# Patient Record
Sex: FEMALE | Race: WHITE | NOT HISPANIC OR LATINO
[De-identification: names, ages, dates, MRNs, and addresses within clinical notes are randomized per-mention and may not be internally consistent; named-entity substitution may affect disease eponyms.]

---

## 2017-07-29 ENCOUNTER — TRANSCRIPTION ENCOUNTER (OUTPATIENT)
Age: 27
End: 2017-07-29

## 2018-08-05 ENCOUNTER — TRANSCRIPTION ENCOUNTER (OUTPATIENT)
Age: 28
End: 2018-08-05

## 2018-12-30 ENCOUNTER — TRANSCRIPTION ENCOUNTER (OUTPATIENT)
Age: 28
End: 2018-12-30

## 2021-01-27 PROBLEM — Z00.00 ENCOUNTER FOR PREVENTIVE HEALTH EXAMINATION: Status: ACTIVE | Noted: 2021-01-27

## 2021-02-04 ENCOUNTER — ASOB RESULT (OUTPATIENT)
Age: 31
End: 2021-02-04

## 2021-02-04 ENCOUNTER — APPOINTMENT (OUTPATIENT)
Dept: ANTEPARTUM | Facility: CLINIC | Age: 31
End: 2021-02-04
Payer: COMMERCIAL

## 2021-02-04 PROCEDURE — 99072 ADDL SUPL MATRL&STAF TM PHE: CPT

## 2021-02-04 PROCEDURE — 76811 OB US DETAILED SNGL FETUS: CPT

## 2021-02-25 ENCOUNTER — ASOB RESULT (OUTPATIENT)
Age: 31
End: 2021-02-25

## 2021-02-25 ENCOUNTER — APPOINTMENT (OUTPATIENT)
Dept: ANTEPARTUM | Facility: CLINIC | Age: 31
End: 2021-02-25
Payer: COMMERCIAL

## 2021-02-25 PROCEDURE — 99072 ADDL SUPL MATRL&STAF TM PHE: CPT

## 2021-02-25 PROCEDURE — 76816 OB US FOLLOW-UP PER FETUS: CPT

## 2021-06-02 ENCOUNTER — TRANSCRIPTION ENCOUNTER (OUTPATIENT)
Age: 31
End: 2021-06-02

## 2021-06-02 ENCOUNTER — INPATIENT (INPATIENT)
Facility: HOSPITAL | Age: 31
LOS: 3 days | Discharge: HOME | End: 2021-06-06
Attending: OBSTETRICS & GYNECOLOGY | Admitting: OBSTETRICS & GYNECOLOGY
Payer: COMMERCIAL

## 2021-06-02 VITALS — DIASTOLIC BLOOD PRESSURE: 100 MMHG | HEART RATE: 68 BPM | SYSTOLIC BLOOD PRESSURE: 169 MMHG

## 2021-06-02 DIAGNOSIS — O26.899 OTHER SPECIFIED PREGNANCY RELATED CONDITIONS, UNSPECIFIED TRIMESTER: ICD-10-CM

## 2021-06-02 DIAGNOSIS — Z3A.00 WEEKS OF GESTATION OF PREGNANCY NOT SPECIFIED: ICD-10-CM

## 2021-06-02 DIAGNOSIS — Z34.80 ENCOUNTER FOR SUPERVISION OF OTHER NORMAL PREGNANCY, UNSPECIFIED TRIMESTER: ICD-10-CM

## 2021-06-02 LAB
ALBUMIN SERPL ELPH-MCNC: 3.1 G/DL — LOW (ref 3.3–5)
ALP SERPL-CCNC: 159 U/L — HIGH (ref 40–120)
ALT FLD-CCNC: 13 U/L — SIGNIFICANT CHANGE UP (ref 10–45)
ANION GAP SERPL CALC-SCNC: 14 MMOL/L — SIGNIFICANT CHANGE UP (ref 5–17)
APPEARANCE UR: CLEAR — SIGNIFICANT CHANGE UP
APTT BLD: 23.4 SEC — LOW (ref 27.5–35.5)
AST SERPL-CCNC: 21 U/L — SIGNIFICANT CHANGE UP (ref 10–40)
BACTERIA # UR AUTO: ABNORMAL
BASOPHILS # BLD AUTO: 0.04 K/UL — SIGNIFICANT CHANGE UP (ref 0–0.2)
BASOPHILS NFR BLD AUTO: 0.6 % — SIGNIFICANT CHANGE UP (ref 0–2)
BILIRUB SERPL-MCNC: 0.1 MG/DL — LOW (ref 0.2–1.2)
BILIRUB UR-MCNC: NEGATIVE — SIGNIFICANT CHANGE UP
BLD GP AB SCN SERPL QL: NEGATIVE — SIGNIFICANT CHANGE UP
BUN SERPL-MCNC: 15 MG/DL — SIGNIFICANT CHANGE UP (ref 7–23)
CALCIUM SERPL-MCNC: 8.4 MG/DL — SIGNIFICANT CHANGE UP (ref 8.4–10.5)
CHLORIDE SERPL-SCNC: 104 MMOL/L — SIGNIFICANT CHANGE UP (ref 96–108)
CO2 SERPL-SCNC: 16 MMOL/L — LOW (ref 22–31)
COLOR SPEC: YELLOW — SIGNIFICANT CHANGE UP
CREAT ?TM UR-MCNC: 164 MG/DL — SIGNIFICANT CHANGE UP
CREAT SERPL-MCNC: 0.72 MG/DL — SIGNIFICANT CHANGE UP (ref 0.5–1.3)
DIFF PNL FLD: ABNORMAL
EOSINOPHIL # BLD AUTO: 0.08 K/UL — SIGNIFICANT CHANGE UP (ref 0–0.5)
EOSINOPHIL NFR BLD AUTO: 1.1 % — SIGNIFICANT CHANGE UP (ref 0–6)
EPI CELLS # UR: 6 /HPF — HIGH
FIBRINOGEN PPP-MCNC: 542 MG/DL — HIGH (ref 290–520)
GLUCOSE BLDC GLUCOMTR-MCNC: 70 MG/DL — SIGNIFICANT CHANGE UP (ref 70–99)
GLUCOSE SERPL-MCNC: 87 MG/DL — SIGNIFICANT CHANGE UP (ref 70–99)
GLUCOSE UR QL: NEGATIVE — SIGNIFICANT CHANGE UP
HCT VFR BLD CALC: 29.5 % — LOW (ref 34.5–45)
HGB BLD-MCNC: 9.8 G/DL — LOW (ref 11.5–15.5)
HYALINE CASTS # UR AUTO: 2 /LPF — SIGNIFICANT CHANGE UP (ref 0–2)
IMM GRANULOCYTES NFR BLD AUTO: 0.8 % — SIGNIFICANT CHANGE UP (ref 0–1.5)
INR BLD: 0.9 RATIO — SIGNIFICANT CHANGE UP (ref 0.88–1.16)
KETONES UR-MCNC: NEGATIVE — SIGNIFICANT CHANGE UP
LDH SERPL L TO P-CCNC: 270 U/L — HIGH (ref 50–242)
LEUKOCYTE ESTERASE UR-ACNC: NEGATIVE — SIGNIFICANT CHANGE UP
LYMPHOCYTES # BLD AUTO: 1.58 K/UL — SIGNIFICANT CHANGE UP (ref 1–3.3)
LYMPHOCYTES # BLD AUTO: 22.2 % — SIGNIFICANT CHANGE UP (ref 13–44)
MCHC RBC-ENTMCNC: 30.3 PG — SIGNIFICANT CHANGE UP (ref 27–34)
MCHC RBC-ENTMCNC: 33.2 GM/DL — SIGNIFICANT CHANGE UP (ref 32–36)
MCV RBC AUTO: 91.3 FL — SIGNIFICANT CHANGE UP (ref 80–100)
MONOCYTES # BLD AUTO: 0.58 K/UL — SIGNIFICANT CHANGE UP (ref 0–0.9)
MONOCYTES NFR BLD AUTO: 8.1 % — SIGNIFICANT CHANGE UP (ref 2–14)
NEUTROPHILS # BLD AUTO: 4.79 K/UL — SIGNIFICANT CHANGE UP (ref 1.8–7.4)
NEUTROPHILS NFR BLD AUTO: 67.2 % — SIGNIFICANT CHANGE UP (ref 43–77)
NITRITE UR-MCNC: NEGATIVE — SIGNIFICANT CHANGE UP
NRBC # BLD: 0 /100 WBCS — SIGNIFICANT CHANGE UP (ref 0–0)
PH UR: 7.5 — SIGNIFICANT CHANGE UP (ref 5–8)
PLATELET # BLD AUTO: 219 K/UL — SIGNIFICANT CHANGE UP (ref 150–400)
POTASSIUM SERPL-MCNC: 4.7 MMOL/L — SIGNIFICANT CHANGE UP (ref 3.5–5.3)
POTASSIUM SERPL-SCNC: 4.7 MMOL/L — SIGNIFICANT CHANGE UP (ref 3.5–5.3)
PROT ?TM UR-MCNC: 276 MG/DL — HIGH (ref 0–12)
PROT SERPL-MCNC: 6.2 G/DL — SIGNIFICANT CHANGE UP (ref 6–8.3)
PROT UR-MCNC: ABNORMAL
PROT/CREAT UR-RTO: 1.7 RATIO — HIGH (ref 0–0.2)
PROTHROM AB SERPL-ACNC: 10.8 SEC — SIGNIFICANT CHANGE UP (ref 10.6–13.6)
RBC # BLD: 3.23 M/UL — LOW (ref 3.8–5.2)
RBC # FLD: 12.5 % — SIGNIFICANT CHANGE UP (ref 10.3–14.5)
RBC CASTS # UR COMP ASSIST: 1 /HPF — SIGNIFICANT CHANGE UP (ref 0–4)
RH IG SCN BLD-IMP: NEGATIVE — SIGNIFICANT CHANGE UP
SARS-COV-2 RNA SPEC QL NAA+PROBE: SIGNIFICANT CHANGE UP
SODIUM SERPL-SCNC: 134 MMOL/L — LOW (ref 135–145)
SP GR SPEC: 1.02 — SIGNIFICANT CHANGE UP (ref 1.01–1.02)
URATE SERPL-MCNC: 5.5 MG/DL — SIGNIFICANT CHANGE UP (ref 2.5–7)
UROBILINOGEN FLD QL: NEGATIVE — SIGNIFICANT CHANGE UP
WBC # BLD: 7.13 K/UL — SIGNIFICANT CHANGE UP (ref 3.8–10.5)
WBC # FLD AUTO: 7.13 K/UL — SIGNIFICANT CHANGE UP (ref 3.8–10.5)
WBC UR QL: 13 /HPF — HIGH (ref 0–5)

## 2021-06-02 RX ORDER — CITRIC ACID/SODIUM CITRATE 300-500 MG
15 SOLUTION, ORAL ORAL EVERY 6 HOURS
Refills: 0 | Status: DISCONTINUED | OUTPATIENT
Start: 2021-06-02 | End: 2021-06-03

## 2021-06-02 RX ORDER — MAGNESIUM SULFATE 500 MG/ML
2 VIAL (ML) INJECTION
Qty: 40 | Refills: 0 | Status: DISCONTINUED | OUTPATIENT
Start: 2021-06-02 | End: 2021-06-03

## 2021-06-02 RX ORDER — OXYTOCIN 10 UNIT/ML
333.33 VIAL (ML) INJECTION
Qty: 20 | Refills: 0 | Status: DISCONTINUED | OUTPATIENT
Start: 2021-06-02 | End: 2021-06-06

## 2021-06-02 RX ORDER — NIFEDIPINE 30 MG
30 TABLET, EXTENDED RELEASE 24 HR ORAL DAILY
Refills: 0 | Status: DISCONTINUED | OUTPATIENT
Start: 2021-06-02 | End: 2021-06-06

## 2021-06-02 RX ORDER — MAGNESIUM SULFATE 500 MG/ML
4 VIAL (ML) INJECTION ONCE
Refills: 0 | Status: COMPLETED | OUTPATIENT
Start: 2021-06-02 | End: 2021-06-02

## 2021-06-02 RX ORDER — ACETAMINOPHEN 500 MG
975 TABLET ORAL ONCE
Refills: 0 | Status: COMPLETED | OUTPATIENT
Start: 2021-06-02 | End: 2021-06-02

## 2021-06-02 RX ORDER — SODIUM CHLORIDE 9 MG/ML
1000 INJECTION, SOLUTION INTRAVENOUS
Refills: 0 | Status: DISCONTINUED | OUTPATIENT
Start: 2021-06-02 | End: 2021-06-03

## 2021-06-02 RX ORDER — NIFEDIPINE 30 MG
10 TABLET, EXTENDED RELEASE 24 HR ORAL ONCE
Refills: 0 | Status: COMPLETED | OUTPATIENT
Start: 2021-06-02 | End: 2021-06-02

## 2021-06-02 RX ORDER — SODIUM CHLORIDE 9 MG/ML
1000 INJECTION INTRAMUSCULAR; INTRAVENOUS; SUBCUTANEOUS
Refills: 0 | Status: DISCONTINUED | OUTPATIENT
Start: 2021-06-02 | End: 2021-06-03

## 2021-06-02 RX ADMIN — SODIUM CHLORIDE 125 MILLILITER(S): 9 INJECTION, SOLUTION INTRAVENOUS at 20:27

## 2021-06-02 RX ADMIN — Medication 50 GM/HR: at 20:45

## 2021-06-02 RX ADMIN — Medication 10 MILLIGRAM(S): at 20:05

## 2021-06-02 RX ADMIN — Medication 200 GRAM(S): at 20:19

## 2021-06-02 RX ADMIN — Medication 975 MILLIGRAM(S): at 23:01

## 2021-06-02 NOTE — OB PROVIDER TRIAGE NOTE - NSOBPROVIDERNOTE_OBGYN_ALL_OB_FT
A/P:  31y  @38wks and 2 days gestation presenting from office with elevated BP. In triage, patient's first BP noted to be 169/100 but repeat /96 so did not require an IVP of medication. +FM  -EFM/[place]  -Continue to monitor BP's q15m  -HELLP labs, P:Cr ratio to be sent  Plan to be d/w Dr. Román Monroe PA-C

## 2021-06-02 NOTE — OB PROVIDER TRIAGE NOTE - HISTORY OF PRESENT ILLNESS
31y  @38wks and 2 days gestation presenting with an elevated BP in the office. Patient was seen for routine visit and her BP was 150/100. She states this is the first time her BP has ever been elevated in pregnancy. She states she had a HA this morning that resolved after resting. She admits to seeing intermittent floaters x1wk and states her ankles have been swollen for a few months. She denies epigastric pain, ctx, LOF or VB. PNC also c/b GDMA1. +FM. GBS -. EFW 7#12 done by ultrasound today.    POBHx: Denies  PGYNHx: Denies fibroids, ovarian cysts, abnormal pap smears, STD's  PMhx: Anxiety-no meds  Medications: PNV  Allergies: NKDA, Latex  PSHx: Denies  Social Hx: Denies etoh/tobacco/drug use    Vital Signs Last 24 Hrs  T(C): --  T(F): --  HR: 63 (2021 19:02) (63 - 68)  BP: 159/96 (2021 19:02) (159/96 - 169/100)  BP(mean): --  RR: --  SpO2: --    General: NAD, A&Ox3  CV: RRR  Lungs: CTA b/l  Abdomen: Soft, NT, gravid  Ext: Mild swelling noted in B/L LE, Neg homans sign B/L    VE: Deferred, .5cm in office today  EFM: 130/moderate variability/+accels/no decels  Short: Irregular

## 2021-06-02 NOTE — OB PROVIDER TRIAGE NOTE - PMH
No pertinent past medical history <<----- Click to add NO pertinent Past Medical History negative...

## 2021-06-02 NOTE — OB PROVIDER H&P - NSHPPHYSICALEXAM_GEN_ALL_CORE
Vital Signs Last 24 Hrs  T(C): 36.8 (02 Jun 2021 19:16), Max: 36.8 (02 Jun 2021 19:16)  T(F): 98.2 (02 Jun 2021 19:16), Max: 98.2 (02 Jun 2021 19:16)  HR: 62 (02 Jun 2021 19:50) (62 - 77)  BP: 154/94 (02 Jun 2021 19:50) (154/94 - 169/100)  BP(mean): --  RR: 17 (02 Jun 2021 19:16) (17 - 17)  SpO2: 100% (02 Jun 2021 19:48) (89% - 100%)    General: NAD, A&Ox3  CV: RRR  Lungs: CTA b/l  Abdomen: Soft, NT, gravid  Ext: Mild swelling noted in B/L LE, Neg homans sign B/L

## 2021-06-02 NOTE — OB PROVIDER H&P - HISTORY OF PRESENT ILLNESS
31y  @38wks and 2 days gestation presenting with an elevated BP in the office. Patient was seen for routine visit and her BP was 150/100. She states this is the first time her BP has ever been elevated in pregnancy. She states she had a HA this morning that resolved after resting. She admits to seeing intermittent floaters x1wk and states her ankles have been swollen for a few months. She denies epigastric pain, ctx, LOF or VB. PNC also c/b GDMA1. +FM. GBS -. EFW 7#12 done by ultrasound today.    POBHx: Denies  PGYNHx: Denies fibroids, ovarian cysts, abnormal pap smears, STD's  PMhx: Anxiety-no meds  Medications: PNV  Allergies: NKDA, Latex  PSHx: Denies  Social Hx: Denies etoh/tobacco/drug use    Vital Signs Last 24 Hrs  T(C): 36.8 (2021 19:16), Max: 36.8 (2021 19:16)  T(F): 98.2 (2021 19:16), Max: 98.2 (2021 19:16)  HR: 62 (2021 19:50) (62 - 77)  BP: 154/94 (2021 19:50) (154/94 - 169/100)  BP(mean): --  RR: 17 (2021 19:16) (17 - 17)  SpO2: 100% (2021 19:48) (89% - 100%)    General: NAD, A&Ox3  CV: RRR  Lungs: CTA b/l  Abdomen: Soft, NT, gravid  Ext: Mild swelling noted in B/L LE, Neg homans sign B/L    VE: Deferred, .5cm in office today  EFM: 130/moderate variability/+accels/no decels  Blades: Irregular

## 2021-06-02 NOTE — OB PROVIDER H&P - ASSESSMENT
A/P:  31y  @38wks and 2 days gestation presenting from the office with elevated BP. While in triage, patient had multiple severe range BP's requiring a push of Procardia 10 IR. Patient is for IOL for sPEC and is to be started on Mg. PNC also c/b GDMA1. +FM. GBS -. EFW 3500g  -Admit to L&D  -Routine labs  -F/u HELLP labs and P:Cr ratio  -EFM/[place]  -NPO, IVF, Bicitra  -IOL with PO cytotec. For CB @12am  -FS q4h in latent labor, q2h in active labor  -Mg to be started  -Continue to monitor BP's q15m  -COVID swab  -Anesthesia consult  -Anticipate   D/w Dr. [place] and Dr. Jovanna Monroe PA-C

## 2021-06-02 NOTE — OB PROVIDER TRIAGE NOTE - NSHPPHYSICALEXAM_GEN_ALL_CORE
Vital Signs Last 24 Hrs  T(C): --  T(F): --  HR: 63 (02 Jun 2021 19:02) (63 - 68)  BP: 159/96 (02 Jun 2021 19:02) (159/96 - 169/100)  BP(mean): --  RR: --  SpO2: --    General: NAD, A&Ox3  CV: RRR  Lungs: CTA b/l  Abdomen: Soft, NT, gravid  Ext: Mild swelling noted in B/L LE, Neg homans sign B/L

## 2021-06-02 NOTE — CHART NOTE - NSCHARTNOTEFT_GEN_A_CORE
PA Lab Note:    HELLP labs:  H/H: 9.8/29.5  Plt: 219  Cr: .72  AST/ALT: 21/13  Uric acid: 5.5  LDH: 270  P/Cr ratio: 1.7    A/P:  -Continue current mgmt    Maye Monroe PA-C

## 2021-06-02 NOTE — OB PROVIDER IHI INDUCTION/AUGMENTATION NOTE - NS_OBIHIREPANPSATTEST_OBGYN_ALL_OB
Chief Complaint   Patient presents with     RECHECK     UMP- HEADACHES/ CHIARI MALFORMATION     Patient was at the ER on Tuesday because the Headaches.  
I have discussed with the Attending the patient's status, as well as the H&P and the fetal status. The Attending agreed with the suggested management.

## 2021-06-03 LAB
COVID-19 SPIKE DOMAIN AB INTERP: NEGATIVE — SIGNIFICANT CHANGE UP
COVID-19 SPIKE DOMAIN ANTIBODY RESULT: 0.4 U/ML — SIGNIFICANT CHANGE UP
GLUCOSE BLDC GLUCOMTR-MCNC: 100 MG/DL — HIGH (ref 70–99)
GLUCOSE BLDC GLUCOMTR-MCNC: 101 MG/DL — HIGH (ref 70–99)
GLUCOSE BLDC GLUCOMTR-MCNC: 79 MG/DL — SIGNIFICANT CHANGE UP (ref 70–99)
GLUCOSE BLDC GLUCOMTR-MCNC: 94 MG/DL — SIGNIFICANT CHANGE UP (ref 70–99)
MAGNESIUM SERPL-MCNC: 4.9 MG/DL — HIGH (ref 1.6–2.6)
MAGNESIUM SERPL-MCNC: 6.1 MG/DL — HIGH (ref 1.6–2.6)
MAGNESIUM SERPL-MCNC: 6.9 MG/DL — HIGH (ref 1.6–2.6)
SARS-COV-2 IGG+IGM SERPL QL IA: 0.4 U/ML — SIGNIFICANT CHANGE UP
SARS-COV-2 IGG+IGM SERPL QL IA: NEGATIVE — SIGNIFICANT CHANGE UP
T PALLIDUM AB TITR SER: NEGATIVE — SIGNIFICANT CHANGE UP

## 2021-06-03 RX ORDER — HYDRALAZINE HCL 50 MG
10 TABLET ORAL ONCE
Refills: 0 | Status: DISCONTINUED | OUTPATIENT
Start: 2021-06-03 | End: 2021-06-06

## 2021-06-03 RX ORDER — FAMOTIDINE 10 MG/ML
20 INJECTION INTRAVENOUS ONCE
Refills: 0 | Status: COMPLETED | OUTPATIENT
Start: 2021-06-03 | End: 2021-06-03

## 2021-06-03 RX ORDER — OXYTOCIN 10 UNIT/ML
333.33 VIAL (ML) INJECTION
Qty: 20 | Refills: 0 | Status: DISCONTINUED | OUTPATIENT
Start: 2021-06-04 | End: 2021-06-06

## 2021-06-03 RX ORDER — MORPHINE SULFATE 50 MG/1
2 CAPSULE, EXTENDED RELEASE ORAL ONCE
Refills: 0 | Status: DISCONTINUED | OUTPATIENT
Start: 2021-06-03 | End: 2021-06-04

## 2021-06-03 RX ORDER — ACETAMINOPHEN 500 MG
1000 TABLET ORAL ONCE
Refills: 0 | Status: COMPLETED | OUTPATIENT
Start: 2021-06-03 | End: 2021-06-03

## 2021-06-03 RX ORDER — OXYCODONE HYDROCHLORIDE 5 MG/1
5 TABLET ORAL
Refills: 0 | Status: COMPLETED | OUTPATIENT
Start: 2021-06-03 | End: 2021-06-10

## 2021-06-03 RX ORDER — LANOLIN
1 OINTMENT (GRAM) TOPICAL EVERY 6 HOURS
Refills: 0 | Status: DISCONTINUED | OUTPATIENT
Start: 2021-06-03 | End: 2021-06-06

## 2021-06-03 RX ORDER — MAGNESIUM SULFATE 500 MG/ML
1.5 VIAL (ML) INJECTION
Qty: 40 | Refills: 0 | Status: DISCONTINUED | OUTPATIENT
Start: 2021-06-03 | End: 2021-06-06

## 2021-06-03 RX ORDER — DIPHENHYDRAMINE HCL 50 MG
25 CAPSULE ORAL EVERY 6 HOURS
Refills: 0 | Status: DISCONTINUED | OUTPATIENT
Start: 2021-06-03 | End: 2021-06-06

## 2021-06-03 RX ORDER — ONDANSETRON 8 MG/1
4 TABLET, FILM COATED ORAL EVERY 6 HOURS
Refills: 0 | Status: DISCONTINUED | OUTPATIENT
Start: 2021-06-03 | End: 2021-06-04

## 2021-06-03 RX ORDER — MAGNESIUM HYDROXIDE 400 MG/1
30 TABLET, CHEWABLE ORAL
Refills: 0 | Status: DISCONTINUED | OUTPATIENT
Start: 2021-06-03 | End: 2021-06-06

## 2021-06-03 RX ORDER — OXYCODONE HYDROCHLORIDE 5 MG/1
5 TABLET ORAL ONCE
Refills: 0 | Status: DISCONTINUED | OUTPATIENT
Start: 2021-06-03 | End: 2021-06-06

## 2021-06-03 RX ORDER — LABETALOL HCL 100 MG
20 TABLET ORAL ONCE
Refills: 0 | Status: DISCONTINUED | OUTPATIENT
Start: 2021-06-03 | End: 2021-06-06

## 2021-06-03 RX ORDER — OXYTOCIN 10 UNIT/ML
4 VIAL (ML) INJECTION
Qty: 30 | Refills: 0 | Status: DISCONTINUED | OUTPATIENT
Start: 2021-06-03 | End: 2021-06-06

## 2021-06-03 RX ORDER — KETOROLAC TROMETHAMINE 30 MG/ML
30 SYRINGE (ML) INJECTION EVERY 6 HOURS
Refills: 0 | Status: DISCONTINUED | OUTPATIENT
Start: 2021-06-03 | End: 2021-06-04

## 2021-06-03 RX ORDER — IBUPROFEN 200 MG
600 TABLET ORAL EVERY 6 HOURS
Refills: 0 | Status: COMPLETED | OUTPATIENT
Start: 2021-06-04 | End: 2022-05-03

## 2021-06-03 RX ORDER — SIMETHICONE 80 MG/1
80 TABLET, CHEWABLE ORAL EVERY 4 HOURS
Refills: 0 | Status: DISCONTINUED | OUTPATIENT
Start: 2021-06-04 | End: 2021-06-06

## 2021-06-03 RX ORDER — SODIUM CHLORIDE 9 MG/ML
1000 INJECTION, SOLUTION INTRAVENOUS
Refills: 0 | Status: DISCONTINUED | OUTPATIENT
Start: 2021-06-03 | End: 2021-06-06

## 2021-06-03 RX ORDER — NALOXONE HYDROCHLORIDE 4 MG/.1ML
0.1 SPRAY NASAL
Refills: 0 | Status: DISCONTINUED | OUTPATIENT
Start: 2021-06-03 | End: 2021-06-04

## 2021-06-03 RX ORDER — METOCLOPRAMIDE HCL 10 MG
10 TABLET ORAL ONCE
Refills: 0 | Status: COMPLETED | OUTPATIENT
Start: 2021-06-03 | End: 2021-06-03

## 2021-06-03 RX ORDER — DEXAMETHASONE 0.5 MG/5ML
4 ELIXIR ORAL EVERY 6 HOURS
Refills: 0 | Status: DISCONTINUED | OUTPATIENT
Start: 2021-06-03 | End: 2021-06-04

## 2021-06-03 RX ORDER — HEPARIN SODIUM 5000 [USP'U]/ML
5000 INJECTION INTRAVENOUS; SUBCUTANEOUS EVERY 12 HOURS
Refills: 0 | Status: DISCONTINUED | OUTPATIENT
Start: 2021-06-03 | End: 2021-06-06

## 2021-06-03 RX ORDER — TETANUS TOXOID, REDUCED DIPHTHERIA TOXOID AND ACELLULAR PERTUSSIS VACCINE, ADSORBED 5; 2.5; 8; 8; 2.5 [IU]/.5ML; [IU]/.5ML; UG/.5ML; UG/.5ML; UG/.5ML
0.5 SUSPENSION INTRAMUSCULAR ONCE
Refills: 0 | Status: DISCONTINUED | OUTPATIENT
Start: 2021-06-04 | End: 2021-06-06

## 2021-06-03 RX ORDER — ACETAMINOPHEN 500 MG
975 TABLET ORAL
Refills: 0 | Status: DISCONTINUED | OUTPATIENT
Start: 2021-06-03 | End: 2021-06-06

## 2021-06-03 RX ORDER — FERROUS SULFATE 325(65) MG
325 TABLET ORAL DAILY
Refills: 0 | Status: DISCONTINUED | OUTPATIENT
Start: 2021-06-03 | End: 2021-06-06

## 2021-06-03 RX ORDER — OXYCODONE HYDROCHLORIDE 5 MG/1
5 TABLET ORAL
Refills: 0 | Status: DISCONTINUED | OUTPATIENT
Start: 2021-06-03 | End: 2021-06-04

## 2021-06-03 RX ORDER — NALBUPHINE HYDROCHLORIDE 10 MG/ML
2.5 INJECTION, SOLUTION INTRAMUSCULAR; INTRAVENOUS; SUBCUTANEOUS EVERY 6 HOURS
Refills: 0 | Status: DISCONTINUED | OUTPATIENT
Start: 2021-06-03 | End: 2021-06-04

## 2021-06-03 RX ADMIN — Medication 4 MILLIUNIT(S)/MIN: at 05:27

## 2021-06-03 RX ADMIN — Medication 10 MILLIGRAM(S): at 01:17

## 2021-06-03 RX ADMIN — FAMOTIDINE 20 MILLIGRAM(S): 10 INJECTION INTRAVENOUS at 10:05

## 2021-06-03 RX ADMIN — Medication 30 MILLIGRAM(S): at 08:40

## 2021-06-03 RX ADMIN — Medication 400 MILLIGRAM(S): at 08:25

## 2021-06-03 NOTE — OB RN INTRAOPERATIVE NOTE - NSSELHIDDEN_OBGYN_ALL_OB_FT
eye pain traumatic
[NS_DeliveryAttending1_OBGYN_ALL_OB_FT:[id number]],[NS_DeliveryAssist1_OBGYN_ALL_OB_FT:[id number]],[NS_DeliveryRN_OBGYN_ALL_OB_FT:[id number]==]

## 2021-06-03 NOTE — OB PROVIDER LABOR PROGRESS NOTE - ASSESSMENT
attg note  cx 3/80/-1  cfb removed  arm clear  fhr cat 1  cont induction  t jazmin mai
attg note  cx no change  no descent   + molding  for P C/S---fully disc with couple, who agree with plan  td wu md
attg note  no change in dilation or descent  pit at 20  fhr cat 1  t jazmin mai
A/P:  -EFM/[place]  -Continue IOL with PO cytotec and CB  -Continue Mg  -Continue to monitor BP's  -Reglan to be given for nausea and HA  -Anticipate   Plan per Dr. Maude Monroe PA-C

## 2021-06-03 NOTE — OB RN DELIVERY SUMMARY - NSSELHIDDEN_OBGYN_ALL_OB_FT
[NS_DeliveryAttending1_OBGYN_ALL_OB_FT:[id number]],[NS_DeliveryAssist1_OBGYN_ALL_OB_FT:[id number]],[NS_DeliveryRN_OBGYN_ALL_OB_FT:[id number]==]

## 2021-06-03 NOTE — OB PROVIDER DELIVERY SUMMARY - NSPROVIDERDELIVERYNOTE_OBGYN_ALL_OB_FT
Liveborn infant FM Apgars 9/9 Wt 7#6  EBL: 700  IVF: 700  UOP: 100  Uncomplicated pLTCS. Intercede placed over hysterotomy

## 2021-06-03 NOTE — OB NEONATOLOGY/PEDIATRICIAN DELIVERY SUMMARY - NSPEDSNEONOTESA_OBGYN_ALL_OB_FT
Requested by OB to attend this primary  delivery at 38.3 weeks for failed induction. Mother is a 33 year old,  , blood type A neg.  Prenatal labs as follow: HIV neg, RPR non-reactive, rubella immune, HBsA neg, GBS neg on 21. /No significant maternal history. No significant prenatal history. This pregnancy was complicated by pre eclampsia treated with mag sulfate and gestational diabetes diet controlled.  AROM at 0832 with clear fluid 7 hours prior to delivery.  Infant emerged in vertex position, vigorous, with good tone. Delayed cord clamping X 45  secs, then brought to warmer. Dried, suctioned and stimulated . Apgars  9/9 Mom wishes to breast. Consents to  Hep B vaccine.  Infant admitted to NBN for routine care.  Parents updated. EOS 0.15

## 2021-06-03 NOTE — OB PROVIDER DELIVERY SUMMARY - NSSELHIDDEN_OBGYN_ALL_OB_FT
[NS_DeliveryAttending1_OBGYN_ALL_OB_FT:[id number]],[NS_DeliveryAssist1_OBGYN_ALL_OB_FT:[id number]],[NS_DeliveryRN_OBGYN_ALL_OB_FT:[id number]==]

## 2021-06-03 NOTE — OB PROVIDER LABOR PROGRESS NOTE - NS_SUBJECTIVE/OBJECTIVE_OBGYN_ALL_OB_FT
PA Note:  Patient seen and evaluated at bedside for placement of cervical balloon. Patient admits to a worsening HA. She recently received Tylenol.    Cervical balloon placed without incident. 60cc of NS filled each uterine and vaginal balloon.

## 2021-06-04 ENCOUNTER — TRANSCRIPTION ENCOUNTER (OUTPATIENT)
Age: 31
End: 2021-06-04

## 2021-06-04 LAB
ALBUMIN SERPL ELPH-MCNC: 3 G/DL — LOW (ref 3.3–5)
ALP SERPL-CCNC: 140 U/L — HIGH (ref 40–120)
ALT FLD-CCNC: 10 U/L — SIGNIFICANT CHANGE UP (ref 10–45)
ANION GAP SERPL CALC-SCNC: 10 MMOL/L — SIGNIFICANT CHANGE UP (ref 5–17)
APTT BLD: 23.8 SEC — LOW (ref 27.5–35.5)
AST SERPL-CCNC: 15 U/L — SIGNIFICANT CHANGE UP (ref 10–40)
BASOPHILS # BLD AUTO: 0.04 K/UL — SIGNIFICANT CHANGE UP (ref 0–0.2)
BASOPHILS NFR BLD AUTO: 0.3 % — SIGNIFICANT CHANGE UP (ref 0–2)
BILIRUB SERPL-MCNC: 0.1 MG/DL — LOW (ref 0.2–1.2)
BUN SERPL-MCNC: 13 MG/DL — SIGNIFICANT CHANGE UP (ref 7–23)
CALCIUM SERPL-MCNC: 6.4 MG/DL — CRITICAL LOW (ref 8.4–10.5)
CHLORIDE SERPL-SCNC: 101 MMOL/L — SIGNIFICANT CHANGE UP (ref 96–108)
CO2 SERPL-SCNC: 19 MMOL/L — LOW (ref 22–31)
CREAT SERPL-MCNC: 0.83 MG/DL — SIGNIFICANT CHANGE UP (ref 0.5–1.3)
EOSINOPHIL # BLD AUTO: 0 K/UL — SIGNIFICANT CHANGE UP (ref 0–0.5)
EOSINOPHIL NFR BLD AUTO: 0 % — SIGNIFICANT CHANGE UP (ref 0–6)
FIBRINOGEN PPP-MCNC: 584 MG/DL — HIGH (ref 290–520)
GLUCOSE SERPL-MCNC: 105 MG/DL — HIGH (ref 70–99)
HCT VFR BLD CALC: 24.9 % — LOW (ref 34.5–45)
HGB BLD-MCNC: 8.2 G/DL — LOW (ref 11.5–15.5)
IMM GRANULOCYTES NFR BLD AUTO: 0.5 % — SIGNIFICANT CHANGE UP (ref 0–1.5)
INR BLD: 0.92 RATIO — SIGNIFICANT CHANGE UP (ref 0.88–1.16)
LDH SERPL L TO P-CCNC: 220 U/L — SIGNIFICANT CHANGE UP (ref 50–242)
LYMPHOCYTES # BLD AUTO: 1.21 K/UL — SIGNIFICANT CHANGE UP (ref 1–3.3)
LYMPHOCYTES # BLD AUTO: 8 % — LOW (ref 13–44)
MAGNESIUM SERPL-MCNC: 5.4 MG/DL — HIGH (ref 1.6–2.6)
MAGNESIUM SERPL-MCNC: 6.6 MG/DL — HIGH (ref 1.6–2.6)
MAGNESIUM SERPL-MCNC: 7.2 MG/DL — CRITICAL HIGH (ref 1.6–2.6)
MCHC RBC-ENTMCNC: 30.3 PG — SIGNIFICANT CHANGE UP (ref 27–34)
MCHC RBC-ENTMCNC: 32.9 GM/DL — SIGNIFICANT CHANGE UP (ref 32–36)
MCV RBC AUTO: 91.9 FL — SIGNIFICANT CHANGE UP (ref 80–100)
MONOCYTES # BLD AUTO: 0.56 K/UL — SIGNIFICANT CHANGE UP (ref 0–0.9)
MONOCYTES NFR BLD AUTO: 3.7 % — SIGNIFICANT CHANGE UP (ref 2–14)
NEUTROPHILS # BLD AUTO: 13.32 K/UL — HIGH (ref 1.8–7.4)
NEUTROPHILS NFR BLD AUTO: 87.5 % — HIGH (ref 43–77)
NRBC # BLD: 0 /100 WBCS — SIGNIFICANT CHANGE UP (ref 0–0)
PLATELET # BLD AUTO: 194 K/UL — SIGNIFICANT CHANGE UP (ref 150–400)
POTASSIUM SERPL-MCNC: 4.8 MMOL/L — SIGNIFICANT CHANGE UP (ref 3.5–5.3)
POTASSIUM SERPL-SCNC: 4.8 MMOL/L — SIGNIFICANT CHANGE UP (ref 3.5–5.3)
PROT SERPL-MCNC: 5.6 G/DL — LOW (ref 6–8.3)
PROTHROM AB SERPL-ACNC: 11.1 SEC — SIGNIFICANT CHANGE UP (ref 10.6–13.6)
RBC # BLD: 2.71 M/UL — LOW (ref 3.8–5.2)
RBC # FLD: 12.8 % — SIGNIFICANT CHANGE UP (ref 10.3–14.5)
SODIUM SERPL-SCNC: 130 MMOL/L — LOW (ref 135–145)
URATE SERPL-MCNC: 6.6 MG/DL — SIGNIFICANT CHANGE UP (ref 2.5–7)
WBC # BLD: 15.21 K/UL — HIGH (ref 3.8–10.5)
WBC # FLD AUTO: 15.21 K/UL — HIGH (ref 3.8–10.5)

## 2021-06-04 RX ORDER — MAGNESIUM SULFATE 500 MG/ML
2 VIAL (ML) INJECTION
Qty: 40 | Refills: 0 | Status: DISCONTINUED | OUTPATIENT
Start: 2021-06-04 | End: 2021-06-04

## 2021-06-04 RX ORDER — ACETAMINOPHEN 500 MG
3 TABLET ORAL
Qty: 0 | Refills: 0 | DISCHARGE
Start: 2021-06-04

## 2021-06-04 RX ORDER — MAGNESIUM SULFATE 500 MG/ML
1.5 VIAL (ML) INJECTION
Qty: 40 | Refills: 0 | Status: DISCONTINUED | OUTPATIENT
Start: 2021-06-04 | End: 2021-06-04

## 2021-06-04 RX ORDER — NIFEDIPINE 30 MG
1 TABLET, EXTENDED RELEASE 24 HR ORAL
Qty: 30 | Refills: 0
Start: 2021-06-04 | End: 2021-07-03

## 2021-06-04 RX ORDER — OXYCODONE HYDROCHLORIDE 5 MG/1
1 TABLET ORAL
Qty: 10 | Refills: 0
Start: 2021-06-04

## 2021-06-04 RX ORDER — IBUPROFEN 200 MG
600 TABLET ORAL EVERY 6 HOURS
Refills: 0 | Status: DISCONTINUED | OUTPATIENT
Start: 2021-06-04 | End: 2021-06-06

## 2021-06-04 RX ORDER — OXYCODONE HYDROCHLORIDE 5 MG/1
5 TABLET ORAL
Refills: 0 | Status: DISCONTINUED | OUTPATIENT
Start: 2021-06-04 | End: 2021-06-06

## 2021-06-04 RX ADMIN — Medication 975 MILLIGRAM(S): at 09:23

## 2021-06-04 RX ADMIN — Medication 975 MILLIGRAM(S): at 16:00

## 2021-06-04 RX ADMIN — Medication 325 MILLIGRAM(S): at 12:08

## 2021-06-04 RX ADMIN — HEPARIN SODIUM 5000 UNIT(S): 5000 INJECTION INTRAVENOUS; SUBCUTANEOUS at 00:16

## 2021-06-04 RX ADMIN — Medication 30 MILLIGRAM(S): at 17:56

## 2021-06-04 RX ADMIN — Medication 30 MILLIGRAM(S): at 05:50

## 2021-06-04 RX ADMIN — HEPARIN SODIUM 5000 UNIT(S): 5000 INJECTION INTRAVENOUS; SUBCUTANEOUS at 11:47

## 2021-06-04 RX ADMIN — Medication 30 MILLIGRAM(S): at 18:30

## 2021-06-04 RX ADMIN — Medication 975 MILLIGRAM(S): at 15:09

## 2021-06-04 RX ADMIN — Medication 975 MILLIGRAM(S): at 21:24

## 2021-06-04 RX ADMIN — Medication 975 MILLIGRAM(S): at 10:20

## 2021-06-04 RX ADMIN — Medication 975 MILLIGRAM(S): at 20:54

## 2021-06-04 RX ADMIN — Medication 30 MILLIGRAM(S): at 00:14

## 2021-06-04 RX ADMIN — Medication 30 MILLIGRAM(S): at 11:48

## 2021-06-04 RX ADMIN — Medication 975 MILLIGRAM(S): at 03:12

## 2021-06-04 RX ADMIN — Medication 30 MILLIGRAM(S): at 12:30

## 2021-06-04 RX ADMIN — Medication 975 MILLIGRAM(S): at 06:12

## 2021-06-04 RX ADMIN — Medication 30 MILLIGRAM(S): at 01:00

## 2021-06-04 RX ADMIN — Medication 30 MILLIGRAM(S): at 08:02

## 2021-06-04 NOTE — DISCHARGE NOTE OB - MEDICATION SUMMARY - MEDICATIONS TO TAKE
I will START or STAY ON the medications listed below when I get home from the hospital:    acetaminophen 325 mg oral tablet  -- 3 tab(s) by mouth   -- Indication: For pain    oxyCODONE 5 mg oral tablet  -- 1 tab(s) by mouth every 6 hours, As Needed -Mild Pain (1 - 3) MDD:4  -- Indication: For pain    NIFEdipine 30 mg oral tablet, extended release  -- 1 tab(s) by mouth once a day   -- Indication: For blood pressure    Prenatal Multivitamins oral tablet  -- Indication: For vitamin

## 2021-06-04 NOTE — DISCHARGE NOTE OB - PLAN OF CARE
recovery bp control reg diet, ambulating, pain control nothing per vagina x 6 weeks monitor blood pressures 3 times a day. Call MD if blood pressure is above 150/90 or if you are experiencing severe headaches or visual changes.

## 2021-06-04 NOTE — DISCHARGE NOTE OB - PATIENT PORTAL LINK FT
You can access the FollowMyHealth Patient Portal offered by [facility] by registering at the following website: [email or web address]. By joining [employer]’s FollowMyHealth portal, you will also be able to view your health information using other applications (apps) compatible with our system.

## 2021-06-04 NOTE — DISCHARGE NOTE OB - CARE PLAN
Principal Discharge DX:	 delivery delivered  Goal:	recovery  Assessment and plan of treatment:	reg diet, ambulating, pain control  Secondary Diagnosis:	Severe pre-eclampsia, with delivery  Goal:	bp control   Principal Discharge DX:	 delivery delivered  Goal:	recovery  Assessment and plan of treatment:	reg diet, ambulating, pain control  Secondary Diagnosis:	Severe pre-eclampsia, with delivery  Goal:	bp control  Assessment and plan of treatment:	monitor blood pressures 3 times a day. Call MD if blood pressure is above 150/90 or if you are experiencing severe headaches or visual changes.  Assessment and plan of treatment:	nothing per vagina x 6 weeks

## 2021-06-04 NOTE — DISCHARGE NOTE OB - CARE PROVIDER_API CALL
Tio Santoro)  Obstetrics and Gynecology  [address] [place] [address], [address]  [place] [address]  Phone: [phone]  Fax: [phone]  Follow Up Time:

## 2021-06-05 RX ORDER — CALCIUM CARBONATE 500(1250)
1 TABLET ORAL DAILY
Refills: 0 | Status: DISCONTINUED | OUTPATIENT
Start: 2021-06-05 | End: 2021-06-06

## 2021-06-05 RX ADMIN — Medication 975 MILLIGRAM(S): at 18:32

## 2021-06-05 RX ADMIN — Medication 975 MILLIGRAM(S): at 11:00

## 2021-06-05 RX ADMIN — HEPARIN SODIUM 5000 UNIT(S): 5000 INJECTION INTRAVENOUS; SUBCUTANEOUS at 00:20

## 2021-06-05 RX ADMIN — Medication 975 MILLIGRAM(S): at 19:15

## 2021-06-05 RX ADMIN — Medication 30 MILLIGRAM(S): at 09:13

## 2021-06-05 RX ADMIN — Medication 325 MILLIGRAM(S): at 12:38

## 2021-06-05 RX ADMIN — Medication 1 TABLET(S): at 20:49

## 2021-06-05 RX ADMIN — Medication 600 MILLIGRAM(S): at 18:33

## 2021-06-05 RX ADMIN — Medication 600 MILLIGRAM(S): at 00:50

## 2021-06-05 RX ADMIN — Medication 600 MILLIGRAM(S): at 00:20

## 2021-06-05 RX ADMIN — Medication 600 MILLIGRAM(S): at 06:15

## 2021-06-05 RX ADMIN — Medication 600 MILLIGRAM(S): at 12:38

## 2021-06-05 RX ADMIN — Medication 600 MILLIGRAM(S): at 13:30

## 2021-06-05 RX ADMIN — Medication 975 MILLIGRAM(S): at 10:13

## 2021-06-05 RX ADMIN — HEPARIN SODIUM 5000 UNIT(S): 5000 INJECTION INTRAVENOUS; SUBCUTANEOUS at 12:37

## 2021-06-05 RX ADMIN — Medication 1 TABLET(S): at 12:38

## 2021-06-05 RX ADMIN — Medication 600 MILLIGRAM(S): at 05:45

## 2021-06-05 RX ADMIN — Medication 600 MILLIGRAM(S): at 19:15

## 2021-06-05 RX ADMIN — Medication 975 MILLIGRAM(S): at 04:39

## 2021-06-05 RX ADMIN — Medication 975 MILLIGRAM(S): at 04:09

## 2021-06-05 NOTE — DIETITIAN INITIAL EVALUATION ADULT. - PERTINENT MEDS FT
lactated ringers  oxycodone PRN  prenatal multivitamin  Mylicon PRN  ferrous sulfate  magnesium hydroxide PRN  magnesium sulfate infusion

## 2021-06-05 NOTE — DIETITIAN INITIAL EVALUATION ADULT. - CHIEF COMPLAINT
Pt is a 30 yo  POD#2 s/p pLTCS for arrest complicated by sPEC, s/p Mag. Antepartum course significant for GDM. Pt plans on breastfeeding infant.

## 2021-06-05 NOTE — DIETITIAN INITIAL EVALUATION ADULT. - SIGNS/SYMPTOMS
as evidenced by pt with limited prior knowledge of GDM diet education for post-partum period as evidenced by increased energy and nutrient needs with breastfeeding

## 2021-06-05 NOTE — DIETITIAN INITIAL EVALUATION ADULT. - ADD RECOMMEND
Post-partum GDM diet education provided: 1) Increased risk of developing T2DM and GDM and ways to help prevent development. 2) 4-12 week fasting oral glucose tolerance test follow-up as outpatient 3) General healthful diet and physical activity recommendations discussed 4) Increased energy needs with breastfeeding. After-delivery GDM education handout provided.

## 2021-06-06 VITALS
RESPIRATION RATE: 18 BRPM | TEMPERATURE: 98 F | SYSTOLIC BLOOD PRESSURE: 137 MMHG | OXYGEN SATURATION: 98 % | DIASTOLIC BLOOD PRESSURE: 88 MMHG | HEART RATE: 91 BPM

## 2021-06-06 PROCEDURE — 82962 GLUCOSE BLOOD TEST: CPT

## 2021-06-06 PROCEDURE — 81001 URINALYSIS AUTO W/SCOPE: CPT

## 2021-06-06 PROCEDURE — 85025 COMPLETE CBC W/AUTO DIFF WBC: CPT

## 2021-06-06 PROCEDURE — 59050 FETAL MONITOR W/REPORT: CPT

## 2021-06-06 PROCEDURE — 82570 ASSAY OF URINE CREATININE: CPT

## 2021-06-06 PROCEDURE — 59025 FETAL NON-STRESS TEST: CPT

## 2021-06-06 PROCEDURE — 84156 ASSAY OF PROTEIN URINE: CPT

## 2021-06-06 PROCEDURE — G0463: CPT

## 2021-06-06 PROCEDURE — 86780 TREPONEMA PALLIDUM: CPT

## 2021-06-06 PROCEDURE — 86850 RBC ANTIBODY SCREEN: CPT

## 2021-06-06 PROCEDURE — 85610 PROTHROMBIN TIME: CPT

## 2021-06-06 PROCEDURE — 85730 THROMBOPLASTIN TIME PARTIAL: CPT

## 2021-06-06 PROCEDURE — 86769 SARS-COV-2 COVID-19 ANTIBODY: CPT

## 2021-06-06 PROCEDURE — 84550 ASSAY OF BLOOD/URIC ACID: CPT

## 2021-06-06 PROCEDURE — 80053 COMPREHEN METABOLIC PANEL: CPT

## 2021-06-06 PROCEDURE — 86900 BLOOD TYPING SEROLOGIC ABO: CPT

## 2021-06-06 PROCEDURE — 83615 LACTATE (LD) (LDH) ENZYME: CPT

## 2021-06-06 PROCEDURE — 86901 BLOOD TYPING SEROLOGIC RH(D): CPT

## 2021-06-06 PROCEDURE — 87635 SARS-COV-2 COVID-19 AMP PRB: CPT

## 2021-06-06 PROCEDURE — 85384 FIBRINOGEN ACTIVITY: CPT

## 2021-06-06 PROCEDURE — 83735 ASSAY OF MAGNESIUM: CPT

## 2021-06-06 RX ADMIN — Medication 600 MILLIGRAM(S): at 00:41

## 2021-06-06 RX ADMIN — Medication 1 TABLET(S): at 12:33

## 2021-06-06 RX ADMIN — Medication 975 MILLIGRAM(S): at 07:01

## 2021-06-06 RX ADMIN — HEPARIN SODIUM 5000 UNIT(S): 5000 INJECTION INTRAVENOUS; SUBCUTANEOUS at 00:11

## 2021-06-06 RX ADMIN — Medication 975 MILLIGRAM(S): at 06:31

## 2021-06-06 RX ADMIN — Medication 975 MILLIGRAM(S): at 12:33

## 2021-06-06 RX ADMIN — Medication 600 MILLIGRAM(S): at 00:11

## 2021-06-06 RX ADMIN — Medication 600 MILLIGRAM(S): at 12:33

## 2021-06-06 RX ADMIN — Medication 30 MILLIGRAM(S): at 09:35

## 2021-06-06 RX ADMIN — Medication 325 MILLIGRAM(S): at 12:33

## 2021-06-06 RX ADMIN — Medication 600 MILLIGRAM(S): at 06:30

## 2021-06-06 RX ADMIN — Medication 975 MILLIGRAM(S): at 00:41

## 2021-06-06 RX ADMIN — Medication 600 MILLIGRAM(S): at 07:00

## 2021-06-06 RX ADMIN — Medication 975 MILLIGRAM(S): at 00:11

## 2021-06-06 NOTE — PROGRESS NOTE ADULT - ATTENDING COMMENTS
I agree with the resident's note above.    Patient is doing well. Pain is well controlled. Tolerating regular diet, ambulating  Vital signs stable  BPs well controlled on procardia  Incision is c/d/i.    Plan:  Reg diet  Ambulating  Pain control  d/c Magnesium at 2:30PM  Continue procardia  continue monitoring BPs  Routine postpartum care    gchow
agree with above note  bp's stable for discharge  cont procardia xl 30 mg daily  t jazmin mai
agree with above note  stable bp on procardia xl 30 mg  discharge  t jazmin mai

## 2021-06-06 NOTE — PROGRESS NOTE ADULT - ASSESSMENT
32 y/o  POD#3 s/p pLTCS for arrest c/b sPEC, s/p Mg for seizure ppx. BPs well controlled overnight. Patient asymptomatic and doing well postpartum.       #sPEC  - s/p Mag x24 hours for seizure ppx  - c/w Procardia XL 30 QD  - HELLP labs PRN  - continue BP and symptom monitoring    #Postpartum State  - Motrin, Tylenol, Oxy for pain control  - Encourage ambulation  - Regular diet  - HSQ for DVT ppx  - Discharge planning 
A/P: 32y/o  POD#1 s/p pLTCS for arrest c/b sPEC, on Mad. BPs controlled overnight with PO medication overnight. Patient asymptomatic and progressing appropriately in the postpartum period.    #sPEC  - c/w Mag x24 hours for seizure ppx  - c/w Procardia XL 30 QD  - AM HELLP labs  - BP and symptom monitoring    #Postpartum State  - Motrin, Tylenol, Oxy for pain control  - Encourage ambulation  - Regular diet  - HSQ for DVT ppx    MALATHI Nugent PGY3
A/P: 32y/o  POD#2 s/p pLTCS for arrest c/b sPEC, s/p Mag. BPs controlled overnight with PO medication overnight. Patient asymptomatic and progressing appropriately in the postpartum period.    #sPEC  - s/p Mag x24 hours for seizure ppx  - c/w Procardia XL 30 QD  - HELLP labs PRN  - BP and symptom monitoring    #Postpartum State  - Motrin, Tylenol, Oxy for pain control  - Encourage ambulation  - Regular diet  - HSQ for DVT ppx    MALATHI Nugent PGY3
yes/2001 - after child birth

## 2021-06-06 NOTE — PROGRESS NOTE ADULT - SUBJECTIVE AND OBJECTIVE BOX
OB Progress Note:  Delivery, POD#1    S: Her pain is well controlled. She is tolerating a regular diet but not yet passing flatus. Denies N/V. Denies CP/SOB/lightheadedness/dizziness. Ambulating without difficulty. Doll in place.     O:   Vital Signs Last 24 Hrs  T(C): 36.4 (2021 00:09), Max: 36.8 (2021 09:03)  T(F): 97.5 (2021 00:09), Max: 98.24 (2021 09:03)  HR: 77 (2021 00:09) (56 - 116)  BP: 113/78 (2021 00:09) (83/50 - 189/83)  BP(mean): 101 (2021 18:30) (62 - 101)  RR: 18 (2021 02:06) (16 - 20)  SpO2: 98% (2021 02:06) (91% - 100%)    Labs:  Blood type: A Negative  Rubella IgG: RPR: Negative                          9.8<L>   7.13 >-----------< 219    (  @ 19:26 )             29.5<L>    -21 @ 19:26      134<L>  |  104  |  15  ----------------------------<  87  4.7   |  16<L>  |  0.72        Ca    8.4      2021 19:26  Mg     6.6<H>     06-04  Mg     6.9<H>     06-03  Mg     6.1<H>     06-03  Mg     4.9<H>     06-03    TPro  6.2  /  Alb  3.1<L>  /  TBili  0.1<L>  /  DBili  x   /  AST  21  /  ALT  13  /  AlkPhos  159<H>  -21 @ 19:26          acetaminophen   Tablet .. 975 milliGRAM(s) Oral <User Schedule>  dexAMETHasone  Injectable 4 milliGRAM(s) IV Push every 6 hours PRN  diphenhydrAMINE 25 milliGRAM(s) Oral every 6 hours PRN  ferrous    sulfate 325 milliGRAM(s) Oral daily  heparin   Injectable 5000 Unit(s) SubCutaneous every 12 hours  hydrALAZINE Injectable 10 milliGRAM(s) IV Push once  ketorolac   Injectable 30 milliGRAM(s) IV Push every 6 hours  labetalol Injectable 20 milliGRAM(s) IV Push once  lactated ringers. 1000 milliLiter(s) IV Continuous <Continuous>  lactated ringers. 1000 milliLiter(s) IV Continuous <Continuous>  lanolin Ointment 1 Application(s) Topical every 6 hours PRN  magnesium hydroxide Suspension 30 milliLiter(s) Oral two times a day PRN  magnesium sulfate Infusion 1.5 Gm/Hr IV Continuous <Continuous>  magnesium sulfate Infusion 2 Gm/Hr IV Continuous <Continuous>  morphine PF Epidural 2 milliGRAM(s) Epidural once  nalbuphine Injectable 2.5 milliGRAM(s) IV Push every 6 hours PRN  naloxone Injectable 0.1 milliGRAM(s) IV Push every 3 minutes PRN  NIFEdipine XL 30 milliGRAM(s) Oral daily  ondansetron Injectable 4 milliGRAM(s) IV Push every 6 hours PRN  oxyCODONE    IR 5 milliGRAM(s) Oral every 3 hours PRN  oxyCODONE    IR 5 milliGRAM(s) Oral every 3 hours PRN  oxyCODONE    IR 5 milliGRAM(s) Oral once PRN  oxytocin Infusion 333.333 milliUNIT(s)/Min IV Continuous <Continuous>  oxytocin Infusion. 4 milliUNIT(s)/Min IV Continuous <Continuous>      PE:  General: NAD  Abdomen: Mildly distended, appropriately tender, incision c/d/i.  Extremities: No erythema, no pitting edema  
Day 1 of Anesthesia Pain Management Service    SUBJECTIVE: Doing ok  Pain Scale Score:          [X] Refer to charted pain scores    THERAPY:  s/p   2  mg PF epidural morphine on 6\3\2021      MEDICATIONS  (STANDING):  acetaminophen   Tablet .. 975 milliGRAM(s) Oral <User Schedule>  ferrous    sulfate 325 milliGRAM(s) Oral daily  heparin   Injectable 5000 Unit(s) SubCutaneous every 12 hours  hydrALAZINE Injectable 10 milliGRAM(s) IV Push once  ketorolac   Injectable 30 milliGRAM(s) IV Push every 6 hours  labetalol Injectable 20 milliGRAM(s) IV Push once  lactated ringers. 1000 milliLiter(s) (125 mL/Hr) IV Continuous <Continuous>  lactated ringers. 1000 milliLiter(s) (72.5 mL/Hr) IV Continuous <Continuous>  magnesium sulfate Infusion 1.5 Gm/Hr (37.5 mL/Hr) IV Continuous <Continuous>  magnesium sulfate Infusion 1.5 Gm/Hr (37.5 mL/Hr) IV Continuous <Continuous>  morphine PF Epidural 2 milliGRAM(s) Epidural once  NIFEdipine XL 30 milliGRAM(s) Oral daily  oxytocin Infusion 333.333 milliUNIT(s)/Min (1000 mL/Hr) IV Continuous <Continuous>  oxytocin Infusion. 4 milliUNIT(s)/Min (4 mL/Hr) IV Continuous <Continuous>    MEDICATIONS  (PRN):  dexAMETHasone  Injectable 4 milliGRAM(s) IV Push every 6 hours PRN Nausea  diphenhydrAMINE 25 milliGRAM(s) Oral every 6 hours PRN Pruritus  lanolin Ointment 1 Application(s) Topical every 6 hours PRN Sore Nipples  magnesium hydroxide Suspension 30 milliLiter(s) Oral two times a day PRN Constipation  nalbuphine Injectable 2.5 milliGRAM(s) IV Push every 6 hours PRN Pruritus  naloxone Injectable 0.1 milliGRAM(s) IV Push every 3 minutes PRN For ANY of the following changes in patient status:  A. Breaths Per Minute LESS THAN 10, B. Oxygen saturation LESS THAN 90%, C. Sedation score of 6 for Stop After: 4 Times  ondansetron Injectable 4 milliGRAM(s) IV Push every 6 hours PRN Nausea  oxyCODONE    IR 5 milliGRAM(s) Oral every 3 hours PRN Mild Pain (1 - 3)  oxyCODONE    IR 5 milliGRAM(s) Oral every 3 hours PRN Moderate to Severe Pain (4-10)  oxyCODONE    IR 5 milliGRAM(s) Oral once PRN Moderate to Severe Pain (4-10)      OBJECTIVE:    Sedation:        	[X] Alert	            [ ] Drowsy	[ ] Arousable      [ ] Asleep       [ ] Unresponsive    Side Effects:	[X] None 	[ ] Nausea	[ ] Vomiting         [ ] Pruritus  		[ ] Weakness            [ ] Numbness	          [ ] Other:    Vital Signs Last 24 Hrs  T(C): 36.5 (04 Jun 2021 06:06), Max: 36.7 (03 Jun 2021 10:47)  T(F): 97.7 (04 Jun 2021 06:06), Max: 98.06 (03 Jun 2021 10:47)  HR: 71 (04 Jun 2021 08:00) (65 - 93)  BP: 120/81 (04 Jun 2021 08:00) (83/50 - 154/85)  BP(mean): 101 (03 Jun 2021 18:30) (62 - 101)  RR: 18 (04 Jun 2021 08:00) (16 - 20)  SpO2: 99% (04 Jun 2021 08:00) (97% - 100%)    ASSESSMENT/ PLAN  [X] Patient to be transitioned to prn analgesics later today  [X] Pain management per primary service, pain service to sign off   [X]Documentation and Verification of current medications
OB Progress Note: LTCS, POD#2    S: Pain is well controlled. She is tolerating a regular diet and passing flatus. She is voiding spontaneously, and ambulating without difficulty. Denies CP/SOB. Denies lightheadedness/dizziness. Denies N/V.    O:  Vitals:  Vital Signs Last 24 Hrs  T(C): 36.6 (05 Jun 2021 00:15), Max: 36.6 (04 Jun 2021 22:10)  T(F): 97.9 (05 Jun 2021 00:15), Max: 97.9 (04 Jun 2021 22:10)  HR: 72 (05 Jun 2021 00:15) (70 - 82)  BP: 130/84 (05 Jun 2021 00:15) (101/65 - 130/84)  BP(mean): --  RR: 18 (05 Jun 2021 00:15) (16 - 18)  SpO2: 98% (05 Jun 2021 00:15) (97% - 99%)    MEDICATIONS  (STANDING):  acetaminophen   Tablet .. 975 milliGRAM(s) Oral <User Schedule>  diphtheria/tetanus/pertussis (acellular) Vaccine (ADAcel) 0.5 milliLiter(s) IntraMuscular once  ferrous    sulfate 325 milliGRAM(s) Oral daily  heparin   Injectable 5000 Unit(s) SubCutaneous every 12 hours  hydrALAZINE Injectable 10 milliGRAM(s) IV Push once  ibuprofen  Tablet. 600 milliGRAM(s) Oral every 6 hours  labetalol Injectable 20 milliGRAM(s) IV Push once  lactated ringers. 1000 milliLiter(s) (125 mL/Hr) IV Continuous <Continuous>  lactated ringers. 1000 milliLiter(s) (72.5 mL/Hr) IV Continuous <Continuous>  magnesium sulfate Infusion 1.5 Gm/Hr (37.5 mL/Hr) IV Continuous <Continuous>  NIFEdipine XL 30 milliGRAM(s) Oral daily  oxytocin Infusion 333.333 milliUNIT(s)/Min (1000 mL/Hr) IV Continuous <Continuous>  oxytocin Infusion 333.333 milliUNIT(s)/Min (1000 mL/Hr) IV Continuous <Continuous>  oxytocin Infusion. 4 milliUNIT(s)/Min (4 mL/Hr) IV Continuous <Continuous>  prenatal multivitamin 1 Tablet(s) Oral daily      MEDICATIONS  (PRN):  diphenhydrAMINE 25 milliGRAM(s) Oral every 6 hours PRN Pruritus  lanolin Ointment 1 Application(s) Topical every 6 hours PRN Sore Nipples  magnesium hydroxide Suspension 30 milliLiter(s) Oral two times a day PRN Constipation  oxyCODONE    IR 5 milliGRAM(s) Oral every 3 hours PRN Moderate to Severe Pain (4-10)  oxyCODONE    IR 5 milliGRAM(s) Oral once PRN Moderate to Severe Pain (4-10)  simethicone 80 milliGRAM(s) Chew every 4 hours PRN Gas      Labs:  Blood type: A Negative  Rubella IgG: RPR: Negative                          8.2<L>   15.21<H> >-----------< 194    ( 06-04 @ 06:37 )             24.9<L>                        9.8<L>   7.13 >-----------< 219    ( 06-02 @ 19:26 )             29.5<L>    06-04-21 @ 06:37      130<L>  |  101  |  13  ----------------------------<  105<H>  4.8   |  19<L>  |  0.83    06-02-21 @ 19:26      134<L>  |  104  |  15  ----------------------------<  87  4.7   |  16<L>  |  0.72        Ca    6.4<LL>      04 Jun 2021 06:37  Ca    8.4      02 Jun 2021 19:26  Mg     5.4<H>     06-04  Mg     7.2<HH>     06-04  Mg     6.6<H>     06-04  Mg     6.9<H>     06-03  Mg     6.1<H>     06-03  Mg     4.9<H>     06-03    TPro  5.6<L>  /  Alb  3.0<L>  /  TBili  0.1<L>  /  DBili  x   /  AST  15  /  ALT  10  /  AlkPhos  140<H>  06-04-21 @ 06:37  TPro  6.2  /  Alb  3.1<L>  /  TBili  0.1<L>  /  DBili  x   /  AST  21  /  ALT  13  /  AlkPhos  159<H>  06-02-21 @ 19:26        PE:  General: NAD  Abdomen: Soft, appropriately tender, incision c/d/i.  Extremities: No erythema, no pitting edema
Day 1 of Anesthesia Pain Management Service    SUBJECTIVE:  Pain Scale Score:          [X] Refer to charted pain scores    THERAPY: Received  epidurall morphine as above    OBJECTIVE:    Sedation:        	[X] Alert	[ ] Drowsy	[ ] Arousable      [ ] Asleep       [ ] Unresponsive    Side Effects:	[X] None	[ ] Nausea	[ ] Vomiting         [ ] Pruritus  		[ ] Weakness            [ ] Numbness	          [ ] Other:    ASSESSMENT/ PLAN  [X] Patient transitioned to prn analgesics  [X] Pain management per primary service, pain service to sign off   [X]Documentation and Verification of current medications
OB Progress Note: LTCS, POD#3    Pain is well controlled. She is tolerating a regular diet and passing flatus. She is voiding spontaneously, and ambulating without difficulty. Denies CP/SOB, lightheadedness/dizziness, N/V, HA, changes in vision.     Vital Signs Last 24 Hrs  T(C): 36.8 (06 Jun 2021 00:42), Max: 36.8 (05 Jun 2021 17:35)  T(F): 98.2 (06 Jun 2021 00:42), Max: 98.3 (05 Jun 2021 20:45)  HR: 76 (06 Jun 2021 00:42) (63 - 85)  BP: 116/73 (06 Jun 2021 00:42) (113/80 - 135/86)  BP(mean): --  RR: 18 (06 Jun 2021 00:42) (18 - 18)  SpO2: 98% (06 Jun 2021 00:42) (97% - 99%)                            8.2    15.21 )-----------( 194      ( 04 Jun 2021 06:37 )             24.9       06-04    130<L>  |  101  |  13  ----------------------------<  105<H>  4.8   |  19<L>  |  0.83    Ca    6.4<LL>      04 Jun 2021 06:37  Mg     5.4     06-04    TPro  5.6<L>  /  Alb  3.0<L>  /  TBili  0.1<L>  /  DBili  x   /  AST  15  /  ALT  10  /  AlkPhos  140<H>  06-04    LIVER FUNCTIONS - ( 04 Jun 2021 06:37 )  Alb: 3.0 g/dL / Pro: 5.6 g/dL / ALK PHOS: 140 U/L / ALT: 10 U/L / AST: 15 U/L / GGT: x           PT/INR - ( 04 Jun 2021 06:37 )   PT: 11.1 sec;   INR: 0.92 ratio    PTT - ( 04 Jun 2021 06:37 )  PTT:23.8 sec    MEDICATIONS  (STANDING):  acetaminophen   Tablet .. 975 milliGRAM(s) Oral <User Schedule>  diphtheria/tetanus/pertussis (acellular) Vaccine (ADAcel) 0.5 milliLiter(s) IntraMuscular once  ferrous    sulfate 325 milliGRAM(s) Oral daily  heparin   Injectable 5000 Unit(s) SubCutaneous every 12 hours  hydrALAZINE Injectable 10 milliGRAM(s) IV Push once  ibuprofen  Tablet. 600 milliGRAM(s) Oral every 6 hours  labetalol Injectable 20 milliGRAM(s) IV Push once  lactated ringers. 1000 milliLiter(s) (125 mL/Hr) IV Continuous <Continuous>  lactated ringers. 1000 milliLiter(s) (72.5 mL/Hr) IV Continuous <Continuous>  magnesium sulfate Infusion 1.5 Gm/Hr (37.5 mL/Hr) IV Continuous <Continuous>  NIFEdipine XL 30 milliGRAM(s) Oral daily  oxytocin Infusion 333.333 milliUNIT(s)/Min (1000 mL/Hr) IV Continuous <Continuous>  oxytocin Infusion 333.333 milliUNIT(s)/Min (1000 mL/Hr) IV Continuous <Continuous>  oxytocin Infusion. 4 milliUNIT(s)/Min (4 mL/Hr) IV Continuous <Continuous>  prenatal multivitamin 1 Tablet(s) Oral daily      MEDICATIONS  (PRN):  diphenhydrAMINE 25 milliGRAM(s) Oral every 6 hours PRN Pruritus  lanolin Ointment 1 Application(s) Topical every 6 hours PRN Sore Nipples  magnesium hydroxide Suspension 30 milliLiter(s) Oral two times a day PRN Constipation  oxyCODONE    IR 5 milliGRAM(s) Oral every 3 hours PRN Moderate to Severe Pain (4-10)  oxyCODONE    IR 5 milliGRAM(s) Oral once PRN Moderate to Severe Pain (4-10)  simethicone 80 milliGRAM(s) Chew every 4 hours PRN Gas      Labs:  Blood type: A Negative  Rubella IgG: RPR: Negative    PE:  General: NAD, AOx3   Abdomen: Soft, appropriately tender, incision c/d/i.  Extremities: No erythema, no pitting edema

## 2022-04-07 NOTE — OB RN PATIENT PROFILE - PATIENT ON (OXYGEN DELIVERY METHOD)
Problem: RESPIRATORY  Goal: Clear lung sounds  Description: Clear lung sounds  Outcome: Ongoing room air

## 2023-06-07 NOTE — OB PROVIDER TRIAGE NOTE - NS_FETALANOMAL_OBGYN_ALL_OB
The AUA Symptom Score Tool    Symptom Score   1. Incomplete Emptying 3   2. Frequency 4   3. Intermittency 3   4. Urgency 1   5. Weak Stream 1   6. Straining 2   7. Nocturia 1     Total  I-PSS Score:     15   Quality of Life Due to Urinary Symptoms   0 Delighted  1 Pleased  2 Mostly Satisfied  3 Mixed  4 Mostly Dissatisfied  5 Unhappy  6 Terrible 4     Score:   1-7 Mild Symptoms  8-19 Moderate Symptoms  10-35 Severe Symptoms    Developed by: [employer] Measurement Committee                                  No

## 2023-11-17 ENCOUNTER — APPOINTMENT (OUTPATIENT)
Dept: OBGYN | Facility: CLINIC | Age: 33
End: 2023-11-17
Payer: COMMERCIAL

## 2023-11-17 ENCOUNTER — EMERGENCY (EMERGENCY)
Facility: HOSPITAL | Age: 33
LOS: 1 days | Discharge: HOME | End: 2023-11-17
Attending: STUDENT IN AN ORGANIZED HEALTH CARE EDUCATION/TRAINING PROGRAM
Payer: COMMERCIAL

## 2023-11-17 VITALS
DIASTOLIC BLOOD PRESSURE: 71 MMHG | TEMPERATURE: 98 F | RESPIRATION RATE: 18 BRPM | SYSTOLIC BLOOD PRESSURE: 115 MMHG | HEART RATE: 80 BPM | OXYGEN SATURATION: 97 %

## 2023-11-17 VITALS
HEART RATE: 79 BPM | TEMPERATURE: 98 F | OXYGEN SATURATION: 99 % | WEIGHT: 190.04 LBS | SYSTOLIC BLOOD PRESSURE: 125 MMHG | RESPIRATION RATE: 20 BRPM | DIASTOLIC BLOOD PRESSURE: 85 MMHG | HEIGHT: 68 IN

## 2023-11-17 DIAGNOSIS — Z98.891 HISTORY OF UTERINE SCAR FROM PREVIOUS SURGERY: Chronic | ICD-10-CM

## 2023-11-17 LAB
ALBUMIN SERPL ELPH-MCNC: 4.8 G/DL — SIGNIFICANT CHANGE UP (ref 3.3–5)
ALBUMIN SERPL ELPH-MCNC: 4.8 G/DL — SIGNIFICANT CHANGE UP (ref 3.3–5)
ALP SERPL-CCNC: 63 U/L — SIGNIFICANT CHANGE UP (ref 40–120)
ALP SERPL-CCNC: 63 U/L — SIGNIFICANT CHANGE UP (ref 40–120)
ALT FLD-CCNC: 25 U/L — SIGNIFICANT CHANGE UP (ref 10–45)
ALT FLD-CCNC: 25 U/L — SIGNIFICANT CHANGE UP (ref 10–45)
APTT BLD: 27.4 SEC — SIGNIFICANT CHANGE UP (ref 24.5–35.6)
APTT BLD: 27.4 SEC — SIGNIFICANT CHANGE UP (ref 24.5–35.6)
AST SERPL-CCNC: 13 U/L — SIGNIFICANT CHANGE UP (ref 10–40)
AST SERPL-CCNC: 13 U/L — SIGNIFICANT CHANGE UP (ref 10–40)
BASOPHILS # BLD AUTO: 0.03 K/UL — SIGNIFICANT CHANGE UP (ref 0–0.2)
BASOPHILS # BLD AUTO: 0.03 K/UL — SIGNIFICANT CHANGE UP (ref 0–0.2)
BASOPHILS NFR BLD AUTO: 0.4 % — SIGNIFICANT CHANGE UP (ref 0–2)
BASOPHILS NFR BLD AUTO: 0.4 % — SIGNIFICANT CHANGE UP (ref 0–2)
BILIRUB SERPL-MCNC: 0.2 MG/DL — SIGNIFICANT CHANGE UP (ref 0.2–1.2)
BILIRUB SERPL-MCNC: 0.2 MG/DL — SIGNIFICANT CHANGE UP (ref 0.2–1.2)
BUN SERPL-MCNC: 12 MG/DL — SIGNIFICANT CHANGE UP (ref 7–23)
BUN SERPL-MCNC: 12 MG/DL — SIGNIFICANT CHANGE UP (ref 7–23)
CALCIUM SERPL-MCNC: 9.2 MG/DL — SIGNIFICANT CHANGE UP (ref 8.4–10.5)
CALCIUM SERPL-MCNC: 9.2 MG/DL — SIGNIFICANT CHANGE UP (ref 8.4–10.5)
CHLORIDE SERPL-SCNC: 104 MMOL/L — SIGNIFICANT CHANGE UP (ref 96–108)
CHLORIDE SERPL-SCNC: 104 MMOL/L — SIGNIFICANT CHANGE UP (ref 96–108)
CO2 SERPL-SCNC: 25 MMOL/L — SIGNIFICANT CHANGE UP (ref 22–31)
CO2 SERPL-SCNC: 25 MMOL/L — SIGNIFICANT CHANGE UP (ref 22–31)
CREAT SERPL-MCNC: 0.67 MG/DL — SIGNIFICANT CHANGE UP (ref 0.5–1.3)
CREAT SERPL-MCNC: 0.67 MG/DL — SIGNIFICANT CHANGE UP (ref 0.5–1.3)
EGFR: 117 ML/MIN/1.73M2 — SIGNIFICANT CHANGE UP
EGFR: 117 ML/MIN/1.73M2 — SIGNIFICANT CHANGE UP
EOSINOPHIL # BLD AUTO: 0.07 K/UL — SIGNIFICANT CHANGE UP (ref 0–0.5)
EOSINOPHIL # BLD AUTO: 0.07 K/UL — SIGNIFICANT CHANGE UP (ref 0–0.5)
EOSINOPHIL NFR BLD AUTO: 0.9 % — SIGNIFICANT CHANGE UP (ref 0–6)
EOSINOPHIL NFR BLD AUTO: 0.9 % — SIGNIFICANT CHANGE UP (ref 0–6)
GLUCOSE SERPL-MCNC: 107 MG/DL — HIGH (ref 70–99)
GLUCOSE SERPL-MCNC: 107 MG/DL — HIGH (ref 70–99)
HCG SERPL-ACNC: 4966 MIU/ML — HIGH
HCG SERPL-ACNC: 4966 MIU/ML — HIGH
HCT VFR BLD CALC: 36.6 % — SIGNIFICANT CHANGE UP (ref 34.5–45)
HCT VFR BLD CALC: 36.6 % — SIGNIFICANT CHANGE UP (ref 34.5–45)
HGB BLD-MCNC: 12.5 G/DL — SIGNIFICANT CHANGE UP (ref 11.5–15.5)
HGB BLD-MCNC: 12.5 G/DL — SIGNIFICANT CHANGE UP (ref 11.5–15.5)
IMM GRANULOCYTES NFR BLD AUTO: 0.3 % — SIGNIFICANT CHANGE UP (ref 0–0.9)
IMM GRANULOCYTES NFR BLD AUTO: 0.3 % — SIGNIFICANT CHANGE UP (ref 0–0.9)
INR BLD: 0.99 RATIO — SIGNIFICANT CHANGE UP (ref 0.85–1.18)
INR BLD: 0.99 RATIO — SIGNIFICANT CHANGE UP (ref 0.85–1.18)
LYMPHOCYTES # BLD AUTO: 1.8 K/UL — SIGNIFICANT CHANGE UP (ref 1–3.3)
LYMPHOCYTES # BLD AUTO: 1.8 K/UL — SIGNIFICANT CHANGE UP (ref 1–3.3)
LYMPHOCYTES # BLD AUTO: 23.8 % — SIGNIFICANT CHANGE UP (ref 13–44)
LYMPHOCYTES # BLD AUTO: 23.8 % — SIGNIFICANT CHANGE UP (ref 13–44)
MCHC RBC-ENTMCNC: 31.4 PG — SIGNIFICANT CHANGE UP (ref 27–34)
MCHC RBC-ENTMCNC: 31.4 PG — SIGNIFICANT CHANGE UP (ref 27–34)
MCHC RBC-ENTMCNC: 34.2 GM/DL — SIGNIFICANT CHANGE UP (ref 32–36)
MCHC RBC-ENTMCNC: 34.2 GM/DL — SIGNIFICANT CHANGE UP (ref 32–36)
MCV RBC AUTO: 92 FL — SIGNIFICANT CHANGE UP (ref 80–100)
MCV RBC AUTO: 92 FL — SIGNIFICANT CHANGE UP (ref 80–100)
MONOCYTES # BLD AUTO: 0.56 K/UL — SIGNIFICANT CHANGE UP (ref 0–0.9)
MONOCYTES # BLD AUTO: 0.56 K/UL — SIGNIFICANT CHANGE UP (ref 0–0.9)
MONOCYTES NFR BLD AUTO: 7.4 % — SIGNIFICANT CHANGE UP (ref 2–14)
MONOCYTES NFR BLD AUTO: 7.4 % — SIGNIFICANT CHANGE UP (ref 2–14)
NEUTROPHILS # BLD AUTO: 5.07 K/UL — SIGNIFICANT CHANGE UP (ref 1.8–7.4)
NEUTROPHILS # BLD AUTO: 5.07 K/UL — SIGNIFICANT CHANGE UP (ref 1.8–7.4)
NEUTROPHILS NFR BLD AUTO: 67.2 % — SIGNIFICANT CHANGE UP (ref 43–77)
NEUTROPHILS NFR BLD AUTO: 67.2 % — SIGNIFICANT CHANGE UP (ref 43–77)
NRBC # BLD: 0 /100 WBCS — SIGNIFICANT CHANGE UP (ref 0–0)
NRBC # BLD: 0 /100 WBCS — SIGNIFICANT CHANGE UP (ref 0–0)
PLATELET # BLD AUTO: 244 K/UL — SIGNIFICANT CHANGE UP (ref 150–400)
PLATELET # BLD AUTO: 244 K/UL — SIGNIFICANT CHANGE UP (ref 150–400)
POTASSIUM SERPL-MCNC: 4.1 MMOL/L — SIGNIFICANT CHANGE UP (ref 3.5–5.3)
POTASSIUM SERPL-MCNC: 4.1 MMOL/L — SIGNIFICANT CHANGE UP (ref 3.5–5.3)
POTASSIUM SERPL-SCNC: 4.1 MMOL/L — SIGNIFICANT CHANGE UP (ref 3.5–5.3)
POTASSIUM SERPL-SCNC: 4.1 MMOL/L — SIGNIFICANT CHANGE UP (ref 3.5–5.3)
PROT SERPL-MCNC: 7.4 G/DL — SIGNIFICANT CHANGE UP (ref 6–8.3)
PROT SERPL-MCNC: 7.4 G/DL — SIGNIFICANT CHANGE UP (ref 6–8.3)
PROTHROM AB SERPL-ACNC: 10.9 SEC — SIGNIFICANT CHANGE UP (ref 9.5–13)
PROTHROM AB SERPL-ACNC: 10.9 SEC — SIGNIFICANT CHANGE UP (ref 9.5–13)
RBC # BLD: 3.98 M/UL — SIGNIFICANT CHANGE UP (ref 3.8–5.2)
RBC # BLD: 3.98 M/UL — SIGNIFICANT CHANGE UP (ref 3.8–5.2)
RBC # FLD: 11.9 % — SIGNIFICANT CHANGE UP (ref 10.3–14.5)
RBC # FLD: 11.9 % — SIGNIFICANT CHANGE UP (ref 10.3–14.5)
SODIUM SERPL-SCNC: 138 MMOL/L — SIGNIFICANT CHANGE UP (ref 135–145)
SODIUM SERPL-SCNC: 138 MMOL/L — SIGNIFICANT CHANGE UP (ref 135–145)
WBC # BLD: 7.55 K/UL — SIGNIFICANT CHANGE UP (ref 3.8–10.5)
WBC # BLD: 7.55 K/UL — SIGNIFICANT CHANGE UP (ref 3.8–10.5)
WBC # FLD AUTO: 7.55 K/UL — SIGNIFICANT CHANGE UP (ref 3.8–10.5)
WBC # FLD AUTO: 7.55 K/UL — SIGNIFICANT CHANGE UP (ref 3.8–10.5)

## 2023-11-17 PROCEDURE — 86900 BLOOD TYPING SEROLOGIC ABO: CPT

## 2023-11-17 PROCEDURE — 76801 OB US < 14 WKS SINGLE FETUS: CPT

## 2023-11-17 PROCEDURE — 99214 OFFICE O/P EST MOD 30 MIN: CPT | Mod: 25

## 2023-11-17 PROCEDURE — 86901 BLOOD TYPING SEROLOGIC RH(D): CPT

## 2023-11-17 PROCEDURE — 85730 THROMBOPLASTIN TIME PARTIAL: CPT

## 2023-11-17 PROCEDURE — 99284 EMERGENCY DEPT VISIT MOD MDM: CPT | Mod: 25

## 2023-11-17 PROCEDURE — 85025 COMPLETE CBC W/AUTO DIFF WBC: CPT

## 2023-11-17 PROCEDURE — 86850 RBC ANTIBODY SCREEN: CPT

## 2023-11-17 PROCEDURE — 76817 TRANSVAGINAL US OBSTETRIC: CPT | Mod: 59

## 2023-11-17 PROCEDURE — 84702 CHORIONIC GONADOTROPIN TEST: CPT

## 2023-11-17 PROCEDURE — 85610 PROTHROMBIN TIME: CPT

## 2023-11-17 PROCEDURE — 80053 COMPREHEN METABOLIC PANEL: CPT

## 2023-11-17 PROCEDURE — 99283 EMERGENCY DEPT VISIT LOW MDM: CPT

## 2023-11-17 PROCEDURE — 36415 COLL VENOUS BLD VENIPUNCTURE: CPT

## 2023-11-17 PROCEDURE — 99285 EMERGENCY DEPT VISIT HI MDM: CPT

## 2023-11-17 PROCEDURE — 96401 CHEMO ANTI-NEOPL SQ/IM: CPT

## 2023-11-17 RX ORDER — ONDANSETRON 8 MG/1
1 TABLET, FILM COATED ORAL
Qty: 1 | Refills: 0
Start: 2023-11-17 | End: 2023-11-19

## 2023-11-17 RX ORDER — METHOTREXATE 2.5 MG/1
100 TABLET ORAL ONCE
Refills: 0 | Status: COMPLETED | OUTPATIENT
Start: 2023-11-17 | End: 2023-11-17

## 2023-11-17 RX ADMIN — METHOTREXATE 100 MILLIGRAM(S): 2.5 TABLET ORAL at 16:29

## 2023-11-17 NOTE — ED ADULT TRIAGE NOTE - RESPIRATORY RATE (BREATHS/MIN)
20 Head Injury High Dose Vitamin A Pregnancy And Lactation Text: High dose vitamin A therapy is contraindicated during pregnancy and breast feeding.

## 2023-11-17 NOTE — ED PROVIDER NOTE - NSFOLLOWUPINSTRUCTIONS_ED_ALL_ED_FT
You were seen in the ED at the recommendation of your OBGYN after ultrasound showed concerns of a stable ectopic pregnancy in the left adnexa. Our OBGYN saw you and confirmed this findings and recommending starting methotrexate therapy which you were given while in the ED.    You were counseled on methotrexate therapy as the treatment for ectopic pregnancy.  The common side effects of the medication as well as restrictions while on the medication were reviewed with you and patient you signed methotrexate information sheet that was placed your chart.      As the OBGYN educated you on, there is a 15-20% methotrexate failure rate and possibility of necessity for additional dose of methotrexate.    Call your physician or return to ED if you experience any severe abdominal pain, dizziness, lightheadedness, severe n/v, or any heavy vaginal bleeding >2 pads/hour for >2 hours.      Follow up for b-hcg level testing on day 4 and day 7 of methotrexate therapy with your private OBGYN. Our OB clinic information is attached as needed.     Follow up with your primary physician in 1-2 days. If needed call [phone] to find a primary care physician or call  [phone] to schedule an appointment with the general medicine.    1. TAKE ALL MEDICATIONS AS DIRECTED.    2. FOR PAIN OR FEVER YOU CAN TAKE IBUPROFEN (MOTRIN, ADVIL) OR ACETAMINOPHEN (TYLENOL) AS NEEDED, AS DIRECTED ON PACKAGING.  3. FOLLOW UP WITH YOUR PRIMARY DOCTOR WITHIN 5 DAYS AS DIRECTED.  4. IF YOU HAD LABS OR IMAGING DONE, YOU WERE GIVEN COPIES OF ALL LABS AND/OR IMAGING RESULTS FROM YOUR ER VISIT--PLEASE TAKE THEM WITH YOU TO YOUR FOLLOW UP APPOINTMENTS.  5. RETURN TO THE ER FOR ANY WORSENING SYMPTOMS OR CONCERNS.  ----------------------------------------------------------------------------------------------------------------  Methotrexate Treatment for an Ectopic Pregnancy  An outline of a person showing reproductive organs. A close-up shows a fetus outside of the uterus.  Methotrexate is a medicine that treats an ectopic pregnancy. In this type of pregnancy, the fertilized egg attaches (implants) outside the uterus. An ectopic pregnancy cannot develop into a healthy baby. Methotrexate works by stopping the growth of the fertilized egg. It also helps the body absorb tissue from the egg. This takes about 2–6 weeks.    An ectopic pregnancy can be life-threatening. However, most ectopic pregnancies can be successfully treated with methotrexate if they are diagnosed early.    Tell a health care provider about:  Any allergies you have.  All medicines you are taking, including vitamins, herbs, eye drops, creams, and over-the-counter medicines.  Any medical conditions you have.    What are the risks?  Your health care provider will talk with you about risks. These may include:  Digestive problems. You may have:  Nausea.  Vomiting.  Diarrhea.  Cramping in your abdomen.  Bleeding or spotting from your vagina.  Feeling dizzy or light-headed.  Mouth sores.  Inflammation of the lining of your lungs (pneumonitis).  Damage to nearby structures or organs, such as damage to the liver.  Hair loss.  There is a risk that methotrexate treatment will fail and the pregnancy will continue. There is also a risk that the ectopic pregnancy might tear or burst (rupture) during use of this medicine.    What happens before the procedure?  Blood tests will be done to check how your disease-fighting system (immune system), liver, and kidneys are working.  You will also have blood tests to measure your pregnancy hormone levels and to find out your blood type.  You will be given a shot of a medicine called Rho(D) immune globulin if:  You are Rh-negative and the father is Rh-positive.  You are Rh-negative and the father's Rh type is unknown.    What happens during the procedure?  Methotrexate will be injected into your muscle.  Methotrexate may be given as a single dose of medicine or a series of doses over time, depending on your response to the treatment.  Methotrexate injections are given by a health care provider. Injection is the most common way that this medicine is used to treat an ectopic pregnancy.  You may also receive other medicines to manage your ectopic pregnancy.  The procedure may vary among health care providers and hospitals.    What happens after the procedure?  After your treatment, it is common to have:  No immediate side effects.  Blood tests will be taken at timed intervals for several days or weeks to check your pregnancy hormone levels. The blood tests will continue until the pregnancy hormone can no longer be found in the blood.    Summary  Methotrexate is a medicine that treats an ectopic pregnancy. This type of pregnancy forms outside the uterus.  There is a risk that methotrexate treatment will fail and the pregnancy will continue. There is also a risk that the ectopic pregnancy might tear or burst while using this medicine.  This medicine may be given in a single dose or a series of doses over time.  After your treatment, blood tests will be done at timed intervals for several days or weeks to check your pregnancy hormone levels. The blood tests will continue until the pregnancy hormone can no longer be found in the blood.  ----------------------------------------------------------------------------------------------------------------   Methotrexate Treatment for an Ectopic Pregnancy, Care After    After the methotrexate treatment, it is common to have cramping in your belly. It is also common to have:  Vaginal bleeding.  Tiredness.  Nausea.  Vomiting.  Diarrhea.  Blood tests will be taken at certain times for days or a week. This is to check your pregnancy hormone levels. This will continue until the hormone can no longer be found in the blood.    The treatment may not work. Surgery may be done to remove the pregnancy.    Follow these instructions at home:  The instructions below may help you care for yourself at home. Your health care provider may give you more instructions. If you have questions, ask your health care provider.    Activity    Do not have sex, douche, or put anything, such as tampons, in your vagina. Wait until your health care provider says it is okay.  Limit activities that take a lot of effort as told by your health care provider.  Medicines    Take over the counter and prescription medicines only as told by your health care provider.  Do not take aspirin, ibuprofen, or other NSAIDs.  Do not take folic acid, prenatal vitamins, or other vitamins that have folic acid.  General instructions    A sign showing that a person should not drink alcohol.  Follow instructions from your health care provider about what you may eat and drink.  Do not drink alcohol.  Avoid foods that produce a lot of gas. This can hide signs of a ruptured ectopic pregnancy.  Methotrexate can make you more sensitive to the sun. Limit time in the sun and artificial UV light, like a tanning bed.  Report symptoms that may indicate a rupture as told by your health care provider.    Contact a health care provider if:  You have persistent nausea and vomiting.  You have persistent diarrhea.  You have a reaction to the medicine, such as:  Tiredness.  Skin rash.  Hair loss.  You develop a cough.  You have chills or a fever.    Get help right away if:  Pain in your belly or in the area between your hip bones (pelvic area) gets worse.  You have more vaginal bleeding.  You feel light-headed or you faint.  You have shortness of breath.  Your heart rate increases.  These symptoms may be an emergency. Get help right away. Call 911.  Do not wait to see if the symptoms will go away.  Do not drive yourself to the hospital.    Summary  After the treatment, it is common to have cramping in your belly. You may also have vaginal bleeding and tiredness.  Blood tests will be taken at certain times for days or a week. This is to check your pregnancy hormone levels. This will continue until the hormone can no longer be found in the blood.  Limit activities that take a lot of effort as told by your health care provider.  Report symptoms that may indicate a rupture as told by your health care provider.

## 2023-11-17 NOTE — CONSULT NOTE ADULT - ASSESSMENT
A/P: 34yo  (LMP ) at 7w5d p/w L adnexal ectopic pregnancy. Patient is hemodynamically stable.    Plan Methotrexate:  -Would recommend methotrexate therapy for treatment of ectopic pregnancy.    -Patient counseled on methotrexate therapy as treatment for ectopic pregnancy.  Common side effects of the medication as well as restrictions while on the medication were reviewed with patient and patient signed methotrexate information sheet that was placed in her chart.    -Reviewed with patient the 15-20% methotrexate failure rate and possibility of necessity for additional dose of methotrexate.   -Consents for methotrexate signed with patient with attending at bedside.    -Chemotherapy drug order form sent  to pharmacy with methotrexate dose calculated based on BSA for patient.   -Patient given strict precautions to call her physican or return to ED if she experiences any severe abdominal pain, dizziness, lightheadedness, severe n/v, or any heavy vaginal bleeding >2 pads/hour for >2 hours.    -Patient instructed on need for follow up of b-hcg on day 4 and day 7 of methotrexate therapy.  Patient intends to follow up with her private OBGYN.    -Once patient receives methotrexate therapy she is cleared for discharge from OBGYN perspective.  Primary management per ED team.     D/w Dr. Steve Mckeon, PGY-2   A/P: 32yo  (LMP ) at 7w5d p/w L adnexal ectopic pregnancy diagnosed on ultrasound from the office. Patient is hemodynamically stable.    Plan Methotrexate:  -Would recommend methotrexate therapy for treatment of ectopic pregnancy.    -Patient counseled on methotrexate therapy as treatment for ectopic pregnancy.  Common side effects of the medication as well as restrictions while on the medication were reviewed with patient and patient signed methotrexate information sheet that was placed in her chart.    -Reviewed with patient the 15-20% methotrexate failure rate and possibility of necessity for additional dose of methotrexate.   -Consents for methotrexate signed with patient with attending at bedside.    -Methotrexate dose calculated based on BSA for patient.   -Patient given strict precautions to call her physician or return to ED if she experiences any severe abdominal pain, dizziness, lightheadedness, severe n/v, or any heavy vaginal bleeding >2 pads/hour for >2 hours.    -Patient instructed on need for follow up of b-hcg on day 4 and day 7 of methotrexate therapy.  Patient intends to follow up with her private OBGYN.    -Once patient receives methotrexate therapy she is cleared for discharge from OBGYN perspective.  Primary management per ED team.     D/w Dr. Steve Mckeon, PGY-2   A/P: 34yo  (LMP ) at 7w5d p/w L adnexal ectopic pregnancy diagnosed on ultrasound from the office. VS wnl, abdominal exam benign, minimal vaginal bleeding. H/H 12.5/36.6, CG 4966, BT A-. Patient is hemodynamically stable.    Plan Methotrexate:  -Would recommend methotrexate therapy for treatment of ectopic pregnancy.    -Patient counseled on methotrexate therapy as treatment for ectopic pregnancy.  Common side effects of the medication as well as restrictions while on the medication were reviewed with patient and patient signed methotrexate information sheet that was placed in her chart.    -Reviewed with patient the 15-20% methotrexate failure rate and possibility of necessity for additional dose of methotrexate.   -Consents for methotrexate signed with patient with attending at bedside.    -Methotrexate dose calculated based on BSA for patient.   -Rhogam for BT A-  -Patient given strict precautions to call her physician or return to ED if she experiences any severe abdominal pain, dizziness, lightheadedness, severe n/v, or any heavy vaginal bleeding >2 pads/hour for >2 hours.    -Patient instructed on need for follow up of b-hcg on day 4 and day 7 of methotrexate therapy. Patient intends to follow up with her private OBGYN.    -Once patient receives methotrexate therapy she is cleared for discharge from OBGYN perspective.  Primary management per ED team.     D/w Dr. Steve Mckeon, PGY-2   A/P: 32yo  (LMP ) at 7w5d p/w L adnexal ectopic pregnancy diagnosed on ultrasound from the office. VS wnl, abdominal exam benign, minimal vaginal bleeding. H/H 12.5/36.6, CG 4966, BT A-. Patient is hemodynamically stable.    Plan Methotrexate:  -Would recommend methotrexate therapy for treatment of ectopic pregnancy.    -Patient counseled on methotrexate therapy as treatment for ectopic pregnancy.  Common side effects of the medication as well as restrictions while on the medication were reviewed with patient and patient signed methotrexate information sheet that was placed in her chart.    -Reviewed with patient the 15-20% methotrexate failure rate and possibility of necessity for additional dose of methotrexate.   -Consents for methotrexate signed with patient with attending at bedside.    -Methotrexate dose calculated based on BSA for patient.   -Patient given strict precautions to call her physician or return to ED if she experiences any severe abdominal pain, dizziness, lightheadedness, severe n/v, or any heavy vaginal bleeding >2 pads/hour for >2 hours.    -Patient instructed on need for follow up of b-hcg on day 4 and day 7 of methotrexate therapy. Patient intends to follow up with her private OBGYN.    -Once patient receives methotrexate therapy she is cleared for discharge from OBGYN perspective.  Primary management per ED team.     D/w Dr. Steve Mckeon, PGY-2

## 2023-11-17 NOTE — ED PROVIDER NOTE - OBJECTIVE STATEMENT
33-year-old G3, P1 presenting to the emergency department sent in by her OB/GYN for concerns for ectopic pregnancy.  LMP  placing her at 7W5D gestation.  Patient was scheduled for first prenatal visit this coming .  2 days ago on 11/15 began having light spotting that has progressed over the last 2 days to heavier bleeding.  Also with associated lower abdominal cramping.  Outpatient OB/GYN Dr. De La Cruz had ultrasound performed which demonstrated no intrauterine pregnancy concerning for ectopic pregnancy and was sent to [facility].  Patient is endorsing continued vaginal bleeding and cramping intermittently.  Denies all other medical complaints including but not limited to chest pain, shortness of breath, nausea/vomiting/diarrhea, urinary symptoms, changes in bowel movements, fever/chills.  Previous to pregnancy history includes first severe preeclampsia requiring mag and induction transitioned to  as well as GDM.  Second pregnancy complicated by spontaneous .

## 2023-11-17 NOTE — ED ADULT TRIAGE NOTE - CHIEF COMPLAINT QUOTE
Pt sent in OB Dr. De La Cruz for ectopic pregnancy on outpatient US. Pt having light vaginal bleeding starting weds starting to get heavier today. Now having some abd cramping  Hx miscarriage

## 2023-11-17 NOTE — ED PROVIDER NOTE - PATIENT PORTAL LINK FT
You can access the FollowMyHealth Patient Portal offered by [facility] by registering at the following website: [email or web address]. By joining [facility]’s FollowMyHealth portal, you will also be able to view your health information using other applications (apps) compatible with our system.

## 2023-11-17 NOTE — ED ADULT NURSE REASSESSMENT NOTE - NS ED NURSE REASSESS COMMENT FT1
Consent for methotrexate in chart; transvaginal ultrasound report showing ectopic pregnancy faxed from performing physician's office and placed in chart. Assisted by MD Benton to receive the report. Patient denies chest pain, SOB, headache, N/V/D, abdominal pain, fever/chills currently. Patient to be discharged following administration of medication.

## 2023-11-17 NOTE — ED PROVIDER NOTE - NS ED ROS FT
GEN: Patient awake and alert. No acute distress, non-toxic. Teary.  Head: Normocephalic, atraumatic.  Neck: Nontender, full ROM.   Eyes: PERRLA b/l. EOMI, no scleral icterus, no conjunctival injection. Moist mucous membranes.  CARDIAC: RRR. Normal S1, S2. No murmur, rubs, or gallops. No peripheral edema noted.  PULM: Speaking in full sentences. CTA B/L no wheeze, rales or rhonchi. No signs of respiratory distress, no accessory muscle usage or nasal flaring.  ABD: Soft, nontender, nondistended. No rebound, no involuntary guarding.   : No CVA tenderness, no suprapubic tenderness. Pelvic deferred for TVUS and OBGYN evaluation.  MSK: Moving all extremities spontaneously. Full ROM in extremities. No obvious deformity.  NEURO: A&Ox3, no focal neurological deficits, CN 2-12 grossly intact.  SKIN: Warm, dry, no rash, no lesions, no open wounds. No urticaria. No jaundice.

## 2023-11-17 NOTE — ED PROVIDER NOTE - CLINICAL SUMMARY MEDICAL DECISION MAKING FREE TEXT BOX
33-year-old female G3, P1 at current 7W5D gestation sent in by OB/GYN for concerns for ectopic pregnancy.  Patient now with 2 days of progressively heavier vaginal bleeding with associated lower abdominal cramping intermittently.  Ultrasound was performed outpatient which demonstrated no intrauterine pregnancy.  Images of ultrasound otherwise on obtainable.  : Reportedly informed in-house OB/GYN team.  Patient is hemodynamically stable.  Afebrile without systemic signs of infection.  Exam benign, abdomen is soft nontender nondistended low suspicion for hemoperitoneum.  Will get screening labs and preop labs, TVUS.  Will collaborate with OB/GYN team.  Dispo likely admission.

## 2023-11-17 NOTE — ED ADULT NURSE NOTE - OBJECTIVE STATEMENT
Patient is a 33y female presenting to the ED from home with c/o vaginal bleeding. Patient A&Ox4. Patient is speaking coherently in full sentences. Reports having vaginal bleeding since Wednesday; denies passing any large clots or soaking through pads. Patient is approximately 7 weeks pregnant. . Had ultrasound performed this morning and was instructed to come to the ED to rule out ectopic pregnancy. Reports developing lower abdominal cramping this morning. Denies N/V/D, chest pain, SOB, fever/chills, headache. Respirations spontaneous, even, and non-labored. Abdomen soft, non-distended and non-tender upon palpation. Denies any daily medication use or medical history.

## 2023-11-17 NOTE — ED PROVIDER NOTE - ATTENDING CONTRIBUTION TO CARE
History and physical as documented above.  [id number] presenting with ectopic on outpatient U/S. LMP in September (around 9/24). Past few days, some mild VB (spotting) after wiping when going to bathroom. Having mild intermittent L-sided abdominal cramping. No severe pain. No nausea, vomiting, diarrhea, urinary symptoms, fever, chills, CP, dyspnea. On exam, VS wnl, abdomen nondistended, mildly ttp over L mid and lower abdomen, no rebound or guarding, otherwise normal exam. Will check labs, repeat U/S, OB consult, symptom management, disposition pending work-up and response to treatment.

## 2023-11-17 NOTE — CONSULT NOTE ADULT - ATTENDING COMMENTS
Agree with above  Patent with left ectopic pregnacy seen on office ultrasound  CBC wnl  CMP wnl    Plan for methotrexate administration for treatment of ectopic. All r/b/a discussed. All questions answered.  Consents reviewed and signed  Weight 88.9kg  Height 172. 7cm  BSA 2.0  Dose 100mg IM of methotrexate    Patient aware to follow up for BHCG draw on Day 4 and Day 7 -- since day 7 is thanksgiving will do day 6  Scripts will be emailed to patient from office    marlen mai

## 2023-11-17 NOTE — CONSULT NOTE ADULT - SUBJECTIVE AND OBJECTIVE BOX
GETACHEW MCKEON  33y  Female [id number]    HPI:      Name of GYN Physician: Dr. Wilson    ObHx:   GynHx: Denies fibroids, cysts, endometriosis, STI's, Abnormal pap smears   PMHx:  SurgHx:  Meds:  Allergies:  Social History:  Denies smoking use, drug use, alcohol use.    Vital Signs Last 24 Hrs  T(C): 36.6 (2023 10:51), Max: 36.6 (2023 10:51)  T(F): 97.9 (2023 10:51), Max: 97.9 (2023 10:51)  HR: 79 (2023 10:51) (79 - 79)  BP: 125/85 (2023 10:51) (125/85 - 125/85)  RR: 20 (2023 10:51) (20 - 20)  SpO2: 99% (2023 10:51) (99% - 99%)    Parameters below as of 2023 10:51  Patient On (Oxygen Delivery Method): room air        Physical Exam:   General: sitting comfortably in bed, NAD   HEENT: neck supple, full ROM  CV: RR S1S2 no m/r/g  Lungs: CTA b/l, good air flow b/l   Back: No CVA tenderness  Abd: Soft, non-tender, non-distended, no rebound or guarding  :  No bleeding on pad changed >2h ago.  Ext: non-tender b/l, no edema     LABS:                              12.5   7.55  )-----------( 244      ( 2023 12:05 )             36.6         138  |  104  |  12  ----------------------------<  107<H>  4.1   |  25  |  0.67    Ca    9.2      2023 12:05    TPro  7.4  /  Alb  4.8  /  TBili  0.2  /  DBili  x   /  AST  13  /  ALT  25  /  AlkPhos  63  -    I&O's Detail    PT/INR - ( 2023 12:05 )   PT: 10.9 sec;   INR: 0.99 ratio         PTT - ( 2023 12:05 )  PTT:27.4 sec  Urinalysis Basic - ( 2023 12:05 )    Color: x / Appearance: x / SG: x / pH: x  Gluc: 107 mg/dL / Ketone: x  / Bili: x / Urobili: x   Blood: x / Protein: x / Nitrite: x   Leuk Esterase: x / RBC: x / WBC x   Sq Epi: x / Non Sq Epi: x / Bacteria: x        RADIOLOGY & ADDITIONAL STUDIES: GETACHEW MCKEON  33y  Female [id number]    HPI: 32yo  (LMP ) at 7w5d EGA by LMP presenting from office for management of L ectopic pregnancy. Patient reports some vaginal bleeding since Wednesday like her period. Denies abdominal pain, fevers/chills, n/v, CP, SOB. Patient presents to ED for administration of methotrexate for stable L adnexal ectopic pregnancy.    Name of GYN Physician: Dr. Wilson    ObHx:  - FT C/S c/b gHTN (), SAB x1  GynHx: Denies fibroids, cysts, endometriosis, STIs, Abnormal pap smears   PMHx: Denies  SurgHx: C/S x1, SAB x1  Meds: Denies  Allergies: Latex (hives)  Social History:  Denies smoking use, drug use, alcohol use.    Vital Signs Last 24 Hrs  T(C): 36.6 (2023 10:51), Max: 36.6 (2023 10:51)  T(F): 97.9 (2023 10:51), Max: 97.9 (2023 10:51)  HR: 79 (2023 10:51) (79 - 79)  BP: 125/85 (2023 10:51) (125/85 - 125/85)  RR: 20 (2023 10:51) (20 - 20)  SpO2: 99% (2023 10:51) (99% - 99%)    Parameters below as of 2023 10:51  Patient On (Oxygen Delivery Method): room air      Physical Exam:   General: sitting comfortably in bed, NAD   HEENT: neck supple, full ROM  CV: clinically well perfused  Lungs: unlabored respirations  Back: No CVA tenderness  Abd: Soft, non-tender, non-distended, no rebound or guarding  :  No bleeding on pad changed >2h ago.  Ext: non-tender b/l, no edema     LABS:                        12.5   7.55  )-----------( 244      ( 2023 12:05 )             36.6         138  |  104  |  12  ----------------------------<  107<H>  4.1   |  25  |  0.67    Ca    9.2      2023 12:05    TPro  7.4  /  Alb  4.8  /  TBili  0.2  /  DBili  x   /  AST  13  /  ALT  25  /  AlkPhos  63  11-17      PT/INR - ( 2023 12:05 )   PT: 10.9 sec;   INR: 0.99 ratio         PTT - ( 2023 12:05 )  PTT:27.4 sec  Urinalysis Basic - ( 2023 12:05 )    Color: x / Appearance: x / SG: x / pH: x  Gluc: 107 mg/dL / Ketone: x  / Bili: x / Urobili: x   Blood: x / Protein: x / Nitrite: x   Leuk Esterase: x / RBC: x / WBC x   Sq Epi: x / Non Sq Epi: x / Bacteria: x        RADIOLOGY & ADDITIONAL STUDIES:  Office ultrasound images reviewed with L adnexal ectopic

## 2023-11-17 NOTE — ED PROVIDER NOTE - PROGRESS NOTE DETAILS
Lui Schwab MD PGY1: Patient reassessed, stable. Per OBGYN patient to get MTX. Consented and ordered. After MTX patient can be d/c and f/u with outpatient obgyn at day 4 and day 7 for beta-hcg check. Lui Schwab MD, PGY1: Sono report faxed over from outpatient OBGYN Dr. De La Cruz documenting ectopic for procedural clearance prior to MTX administration.

## 2023-11-17 NOTE — ED PROVIDER NOTE - NSICDXPASTMEDICALHX_GEN_ALL_CORE_FT
Infectious Disease PAST MEDICAL HISTORY:  DM (diabetes mellitus), gestational     Pre-eclampsia

## 2023-11-20 ENCOUNTER — APPOINTMENT (OUTPATIENT)
Dept: OBGYN | Facility: CLINIC | Age: 33
End: 2023-11-20
Payer: COMMERCIAL

## 2023-11-20 PROBLEM — O14.90 UNSPECIFIED PRE-ECLAMPSIA, UNSPECIFIED TRIMESTER: Chronic | Status: ACTIVE | Noted: 2023-11-17

## 2023-11-20 PROBLEM — O24.419 GESTATIONAL DIABETES MELLITUS IN PREGNANCY, UNSPECIFIED CONTROL: Chronic | Status: ACTIVE | Noted: 2023-11-17

## 2023-11-20 PROCEDURE — 36415 COLL VENOUS BLD VENIPUNCTURE: CPT

## 2023-12-04 ENCOUNTER — APPOINTMENT (OUTPATIENT)
Dept: OBGYN | Facility: CLINIC | Age: 33
End: 2023-12-04
Payer: COMMERCIAL

## 2023-12-04 PROCEDURE — 99213 OFFICE O/P EST LOW 20 MIN: CPT

## 2023-12-12 ENCOUNTER — APPOINTMENT (OUTPATIENT)
Dept: OBGYN | Facility: CLINIC | Age: 33
End: 2023-12-12
Payer: COMMERCIAL

## 2023-12-12 PROCEDURE — 76830 TRANSVAGINAL US NON-OB: CPT

## 2023-12-12 PROCEDURE — 99212 OFFICE O/P EST SF 10 MIN: CPT

## 2023-12-14 ENCOUNTER — APPOINTMENT (OUTPATIENT)
Dept: OBGYN | Facility: CLINIC | Age: 33
End: 2023-12-14

## 2024-07-31 ENCOUNTER — APPOINTMENT (OUTPATIENT)
Dept: OBGYN | Facility: CLINIC | Age: 34
End: 2024-07-31
Payer: COMMERCIAL

## 2024-07-31 PROCEDURE — 99213 OFFICE O/P EST LOW 20 MIN: CPT

## 2024-07-31 PROCEDURE — 36415 COLL VENOUS BLD VENIPUNCTURE: CPT

## 2024-08-26 ENCOUNTER — APPOINTMENT (OUTPATIENT)
Dept: OBGYN | Facility: CLINIC | Age: 34
End: 2024-08-26
Payer: COMMERCIAL

## 2024-08-26 PROCEDURE — 36415 COLL VENOUS BLD VENIPUNCTURE: CPT

## 2024-08-30 ENCOUNTER — OUTPATIENT (OUTPATIENT)
Dept: OUTPATIENT SERVICES | Facility: HOSPITAL | Age: 34
LOS: 1 days | End: 2024-08-30
Payer: COMMERCIAL

## 2024-08-30 VITALS
RESPIRATION RATE: 18 BRPM | SYSTOLIC BLOOD PRESSURE: 116 MMHG | TEMPERATURE: 97 F | DIASTOLIC BLOOD PRESSURE: 73 MMHG | OXYGEN SATURATION: 98 % | HEART RATE: 75 BPM

## 2024-08-30 VITALS
OXYGEN SATURATION: 100 % | DIASTOLIC BLOOD PRESSURE: 68 MMHG | RESPIRATION RATE: 18 BRPM | SYSTOLIC BLOOD PRESSURE: 114 MMHG | TEMPERATURE: 98 F | HEART RATE: 70 BPM

## 2024-08-30 DIAGNOSIS — N97.9 FEMALE INFERTILITY, UNSPECIFIED: ICD-10-CM

## 2024-08-30 DIAGNOSIS — N97.0 FEMALE INFERTILITY ASSOCIATED WITH ANOVULATION: ICD-10-CM

## 2024-08-30 DIAGNOSIS — Z98.891 HISTORY OF UTERINE SCAR FROM PREVIOUS SURGERY: Chronic | ICD-10-CM

## 2024-08-30 DIAGNOSIS — Z87.59 PERSONAL HISTORY OF OTHER COMPLICATIONS OF PREGNANCY, CHILDBIRTH AND THE PUERPERIUM: ICD-10-CM

## 2024-08-30 PROCEDURE — 74740 X-RAY FEMALE GENITAL TRACT: CPT | Mod: 26,GC

## 2024-08-30 PROCEDURE — 58340 CATHETER FOR HYSTEROGRAPHY: CPT | Mod: GC

## 2024-09-06 NOTE — OB RN INTRAOPERATIVE NOTE - NS_SCRUBTECH2_OBGYN_ALL_OB_FT
-- DO NOT REPLY / DO NOT REPLY ALL --  -- This inbox is not monitored. If this was sent to the wrong provider or department, reroute message to P ECO Reroute pool. --  -- Message is from Engagement Center Operations (ECO) --    General Patient Message:     Patient was seen at [facility] and discharged yesterday.  Requesting to schedule a appointment with Dr Isaac and asking for office to call her back.     Caller Information       Contact Date/Time Type Contact Phone/Fax    09/06/2024 08:31 AM CDT Phone (Incoming) Mode [place] JJ (Self) [phone] (H)            Alternative phone number: [phone]    Can a detailed message be left? Yes - Voicemail   Patient has been advised the message will be addressed within 2-3 business days.                 maryana

## 2025-07-18 ENCOUNTER — APPOINTMENT (OUTPATIENT)
Dept: OBGYN | Facility: CLINIC | Age: 35
End: 2025-07-18
Payer: COMMERCIAL

## 2025-07-18 ENCOUNTER — OUTPATIENT (OUTPATIENT)
Dept: OUTPATIENT SERVICES | Facility: HOSPITAL | Age: 35
LOS: 1 days | End: 2025-07-18
Payer: COMMERCIAL

## 2025-07-18 VITALS
OXYGEN SATURATION: 98 % | DIASTOLIC BLOOD PRESSURE: 73 MMHG | WEIGHT: 205.91 LBS | RESPIRATION RATE: 14 BRPM | HEART RATE: 75 BPM | TEMPERATURE: 98 F | SYSTOLIC BLOOD PRESSURE: 104 MMHG | HEIGHT: 68 IN

## 2025-07-18 DIAGNOSIS — Z90.79 ACQUIRED ABSENCE OF OTHER GENITAL ORGAN(S): Chronic | ICD-10-CM

## 2025-07-18 DIAGNOSIS — Z98.890 OTHER SPECIFIED POSTPROCEDURAL STATES: Chronic | ICD-10-CM

## 2025-07-18 DIAGNOSIS — O02.1 MISSED ABORTION: ICD-10-CM

## 2025-07-18 DIAGNOSIS — Z98.891 HISTORY OF UTERINE SCAR FROM PREVIOUS SURGERY: Chronic | ICD-10-CM

## 2025-07-18 LAB
BLD GP AB SCN SERPL QL: POSITIVE — SIGNIFICANT CHANGE UP
HCT VFR BLD CALC: 33.8 % — LOW (ref 34.5–45)
HGB BLD-MCNC: 10.9 G/DL — LOW (ref 11.5–15.5)
MCHC RBC-ENTMCNC: 30.3 PG — SIGNIFICANT CHANGE UP (ref 27–34)
MCHC RBC-ENTMCNC: 32.2 G/DL — SIGNIFICANT CHANGE UP (ref 32–36)
MCV RBC AUTO: 93.9 FL — SIGNIFICANT CHANGE UP (ref 80–100)
NRBC # BLD AUTO: 0 K/UL — SIGNIFICANT CHANGE UP (ref 0–0)
NRBC # FLD: 0 K/UL — SIGNIFICANT CHANGE UP (ref 0–0)
NRBC BLD AUTO-RTO: 0 /100 WBCS — SIGNIFICANT CHANGE UP (ref 0–0)
PLATELET # BLD AUTO: 283 K/UL — SIGNIFICANT CHANGE UP (ref 150–400)
PMV BLD: 10.1 FL — SIGNIFICANT CHANGE UP (ref 7–13)
RBC # BLD: 3.6 M/UL — LOW (ref 3.8–5.2)
RBC # FLD: 12 % — SIGNIFICANT CHANGE UP (ref 10.3–14.5)
RH IG SCN BLD-IMP: NEGATIVE — SIGNIFICANT CHANGE UP
WBC # BLD: 7.98 K/UL — SIGNIFICANT CHANGE UP (ref 3.8–10.5)
WBC # FLD AUTO: 7.98 K/UL — SIGNIFICANT CHANGE UP (ref 3.8–10.5)

## 2025-07-18 PROCEDURE — 86900 BLOOD TYPING SEROLOGIC ABO: CPT

## 2025-07-18 PROCEDURE — 86077 PHYS BLOOD BANK SERV XMATCH: CPT

## 2025-07-18 PROCEDURE — G0463: CPT

## 2025-07-18 PROCEDURE — 86850 RBC ANTIBODY SCREEN: CPT

## 2025-07-18 PROCEDURE — 99214 OFFICE O/P EST MOD 30 MIN: CPT | Mod: 25

## 2025-07-18 PROCEDURE — 86901 BLOOD TYPING SEROLOGIC RH(D): CPT

## 2025-07-18 PROCEDURE — 99459 PELVIC EXAMINATION: CPT

## 2025-07-18 PROCEDURE — 76817 TRANSVAGINAL US OBSTETRIC: CPT

## 2025-07-18 PROCEDURE — 85027 COMPLETE CBC AUTOMATED: CPT

## 2025-07-18 PROCEDURE — 36415 COLL VENOUS BLD VENIPUNCTURE: CPT

## 2025-07-18 RX ORDER — SODIUM CHLORIDE 9 G/1000ML
1000 INJECTION, SOLUTION INTRAVENOUS
Refills: 0 | Status: DISCONTINUED | OUTPATIENT
Start: 2025-07-21 | End: 2025-08-04

## 2025-07-18 NOTE — H&P PST ADULT - ASSESSMENT
DASI score: 8  DASI activity: able to walk up 2 flights of stairs , no activity limiations, housework, shopping  Loose teeth or denture: denies

## 2025-07-18 NOTE — H&P PST ADULT - ATTENDING COMMENTS
34 yo s/p ivf with known twin gestation, admitted for suction D&C for missed ab of both fetuses at 9 weeks----rbas reviewed---td wu md

## 2025-07-18 NOTE — H&P PST ADULT - HISTORY OF PRESENT ILLNESS
36 yo female. . currently under infertility care- IVF, last egg transfer on , IUP at 9 weeks gestation,  recently diagnosed with first trimester missed , now presents to PST scheduled for d&C suction for missed  on .  PMH pre ecampsia, GDM, ectopic pregnancy (s/p unilateral salpingectomy).

## 2025-07-18 NOTE — H&P PST ADULT - ENMT
preventive-screening-services    negative

## 2025-07-18 NOTE — H&P PST ADULT - NSICDXPASTMEDICALHX_GEN_ALL_CORE_FT
PAST MEDICAL HISTORY:  DM (diabetes mellitus), gestational     Pre-eclampsia      PAST MEDICAL HISTORY:  DM (diabetes mellitus), gestational     Pre-eclampsia     S/P ectopic pregnancy

## 2025-07-21 ENCOUNTER — TRANSCRIPTION ENCOUNTER (OUTPATIENT)
Age: 35
End: 2025-07-21

## 2025-07-21 ENCOUNTER — APPOINTMENT (OUTPATIENT)
Dept: OBGYN | Facility: HOSPITAL | Age: 35
End: 2025-07-21

## 2025-07-21 ENCOUNTER — OUTPATIENT (OUTPATIENT)
Dept: OUTPATIENT SERVICES | Facility: HOSPITAL | Age: 35
LOS: 1 days | End: 2025-07-21
Payer: COMMERCIAL

## 2025-07-21 VITALS
TEMPERATURE: 97 F | RESPIRATION RATE: 16 BRPM | OXYGEN SATURATION: 95 % | DIASTOLIC BLOOD PRESSURE: 59 MMHG | SYSTOLIC BLOOD PRESSURE: 99 MMHG | HEART RATE: 55 BPM

## 2025-07-21 VITALS
DIASTOLIC BLOOD PRESSURE: 70 MMHG | TEMPERATURE: 97 F | RESPIRATION RATE: 17 BRPM | WEIGHT: 205.91 LBS | SYSTOLIC BLOOD PRESSURE: 103 MMHG | HEIGHT: 68 IN | HEART RATE: 70 BPM | OXYGEN SATURATION: 98 %

## 2025-07-21 DIAGNOSIS — O02.1 MISSED ABORTION: ICD-10-CM

## 2025-07-21 DIAGNOSIS — Z98.891 HISTORY OF UTERINE SCAR FROM PREVIOUS SURGERY: Chronic | ICD-10-CM

## 2025-07-21 DIAGNOSIS — Z90.79 ACQUIRED ABSENCE OF OTHER GENITAL ORGAN(S): Chronic | ICD-10-CM

## 2025-07-21 DIAGNOSIS — Z98.890 OTHER SPECIFIED POSTPROCEDURAL STATES: Chronic | ICD-10-CM

## 2025-07-21 LAB
BLD GP AB SCN SERPL QL: POSITIVE — SIGNIFICANT CHANGE UP
RH IG SCN BLD-IMP: NEGATIVE — SIGNIFICANT CHANGE UP

## 2025-07-21 PROCEDURE — 88305 TISSUE EXAM BY PATHOLOGIST: CPT | Mod: 26

## 2025-07-21 PROCEDURE — 88305 TISSUE EXAM BY PATHOLOGIST: CPT

## 2025-07-21 PROCEDURE — 86922 COMPATIBILITY TEST ANTIGLOB: CPT

## 2025-07-21 PROCEDURE — 88291 CYTO/MOLECULAR REPORT: CPT

## 2025-07-21 PROCEDURE — 59820 CARE OF MISCARRIAGE: CPT

## 2025-07-21 PROCEDURE — 86901 BLOOD TYPING SEROLOGIC RH(D): CPT

## 2025-07-21 PROCEDURE — 86900 BLOOD TYPING SEROLOGIC ABO: CPT

## 2025-07-21 PROCEDURE — 86850 RBC ANTIBODY SCREEN: CPT

## 2025-07-21 RX ORDER — HYDROMORPHONE/SOD CHLOR,ISO/PF 2 MG/10 ML
1 SYRINGE (ML) INJECTION
Refills: 0 | Status: DISCONTINUED | OUTPATIENT
Start: 2025-07-21 | End: 2025-07-21

## 2025-07-21 RX ORDER — PRENATAL 136/IRON/FOLIC ACID 27 MG-1 MG
0 TABLET ORAL
Refills: 0 | DISCHARGE

## 2025-07-21 RX ORDER — LIDOCAINE HCL/PF 10 MG/ML
0.2 VIAL (ML) INJECTION ONCE
Refills: 0 | Status: COMPLETED | OUTPATIENT
Start: 2025-07-21 | End: 2025-07-21

## 2025-07-21 RX ORDER — HYDROMORPHONE/SOD CHLOR,ISO/PF 2 MG/10 ML
0.5 SYRINGE (ML) INJECTION
Refills: 0 | Status: DISCONTINUED | OUTPATIENT
Start: 2025-07-21 | End: 2025-07-21

## 2025-07-21 RX ORDER — ONDANSETRON HCL/PF 4 MG/2 ML
4 VIAL (ML) INJECTION ONCE
Refills: 0 | Status: DISCONTINUED | OUTPATIENT
Start: 2025-07-21 | End: 2025-08-04

## 2025-07-21 RX ADMIN — SODIUM CHLORIDE 100 MILLILITER(S): 9 INJECTION, SOLUTION INTRAVENOUS at 11:06

## 2025-07-21 RX ADMIN — Medication 3 MILLILITER(S): at 09:57

## 2025-07-25 LAB — SURGICAL PATHOLOGY STUDY: SIGNIFICANT CHANGE UP

## 2025-07-28 ENCOUNTER — APPOINTMENT (OUTPATIENT)
Dept: OBGYN | Facility: CLINIC | Age: 35
End: 2025-07-28
Payer: COMMERCIAL

## 2025-07-28 PROCEDURE — 99024 POSTOP FOLLOW-UP VISIT: CPT

## 2025-08-07 LAB — CHROM ANALY OVERALL INTERP SPEC-IMP: SIGNIFICANT CHANGE UP

## (undated) DEVICE — VACUUM CURETTE BERKLEY OLYMPUS F TIP 6MM

## (undated) DEVICE — VACUUM CURETTE BUSSE HOSP CURVED 14MM

## (undated) DEVICE — VACUUM CURETTE BUSSE HOSP CURVED 12MM

## (undated) DEVICE — VACUUM CURETTE BERKLEY OLYMPUS CURVED 11MM

## (undated) DEVICE — TUBING UTERINE ASPIRATION 3/8" X 6FT W/O ADAPTER

## (undated) DEVICE — SOCK SPECIMEN 3/8"-1/2" MALE PORT

## (undated) DEVICE — VACUUM CURETTE BUSSE HOSP CURVED 10MM

## (undated) DEVICE — SOL IRR POUR NS 0.9% 500ML

## (undated) DEVICE — PREP BETADINE KIT

## (undated) DEVICE — VACUUM CURETTE BUSSE HOSP CURVED 11MM

## (undated) DEVICE — VACUUM CURETTE BERKLEY OLYMPUS CURVED 16MM X 1/2"

## (undated) DEVICE — VACUUM CURETTE MEDGYN CURVED 9MM

## (undated) DEVICE — VACUUM CURETTE BERKLEY OLYMPUS CURVED 9MM

## (undated) DEVICE — PACK LITHOTOMY

## (undated) DEVICE — WARMING BLANKET UPPER ADULT

## (undated) DEVICE — DRAPE 1/2 SHEET 40X57"

## (undated) DEVICE — VACUUM CURETTE BERKLEY OLYMPUS CURVED 8MM

## (undated) DEVICE — VACUUM CURETTE BERKLEY OLYMPUS CURVED 7MM

## (undated) DEVICE — VACUUM CURETTE BERKLEY OLYMPUS CURVED 12MM

## (undated) DEVICE — DRAPE LIGHT HANDLE COVER (GREEN)

## (undated) DEVICE — Device

## (undated) DEVICE — VACUUM CURETTE BUSSE HOSP STRAIGHT 7MM

## (undated) DEVICE — VACUUM CURETTE BUSSE HOSP CURVED 9MM

## (undated) DEVICE — VACUUM CURETTE MEDGYN CURVED 13MM